# Patient Record
Sex: FEMALE | Race: WHITE | NOT HISPANIC OR LATINO | Employment: OTHER | URBAN - METROPOLITAN AREA
[De-identification: names, ages, dates, MRNs, and addresses within clinical notes are randomized per-mention and may not be internally consistent; named-entity substitution may affect disease eponyms.]

---

## 2018-08-08 ENCOUNTER — OFFICE VISIT (OUTPATIENT)
Dept: FAMILY MEDICINE CLINIC | Facility: CLINIC | Age: 63
End: 2018-08-08
Payer: COMMERCIAL

## 2018-08-08 VITALS
SYSTOLIC BLOOD PRESSURE: 126 MMHG | TEMPERATURE: 98.4 F | WEIGHT: 185.4 LBS | BODY MASS INDEX: 29.8 KG/M2 | HEART RATE: 66 BPM | DIASTOLIC BLOOD PRESSURE: 84 MMHG | OXYGEN SATURATION: 98 % | HEIGHT: 66 IN

## 2018-08-08 DIAGNOSIS — J30.9 ALLERGIC RHINITIS, UNSPECIFIED SEASONALITY, UNSPECIFIED TRIGGER: ICD-10-CM

## 2018-08-08 DIAGNOSIS — I10 ESSENTIAL HYPERTENSION: Primary | ICD-10-CM

## 2018-08-08 DIAGNOSIS — Z12.39 SCREENING FOR BREAST CANCER: ICD-10-CM

## 2018-08-08 DIAGNOSIS — D22.9 NUMEROUS MOLES: ICD-10-CM

## 2018-08-08 DIAGNOSIS — Z01.419 ENCOUNTER FOR GYNECOLOGICAL EXAMINATION: ICD-10-CM

## 2018-08-08 PROCEDURE — 99204 OFFICE O/P NEW MOD 45 MIN: CPT | Performed by: FAMILY MEDICINE

## 2018-08-08 PROCEDURE — 3079F DIAST BP 80-89 MM HG: CPT | Performed by: FAMILY MEDICINE

## 2018-08-08 PROCEDURE — 1036F TOBACCO NON-USER: CPT | Performed by: FAMILY MEDICINE

## 2018-08-08 PROCEDURE — 3074F SYST BP LT 130 MM HG: CPT | Performed by: FAMILY MEDICINE

## 2018-08-08 PROCEDURE — 3008F BODY MASS INDEX DOCD: CPT | Performed by: FAMILY MEDICINE

## 2018-08-08 RX ORDER — TRIAMTERENE AND HYDROCHLOROTHIAZIDE 37.5; 25 MG/1; MG/1
2 CAPSULE ORAL DAILY
COMMUNITY
End: 2018-09-04 | Stop reason: SDUPTHER

## 2018-08-08 RX ORDER — ATORVASTATIN CALCIUM 10 MG/1
10 TABLET, FILM COATED ORAL
COMMUNITY
End: 2018-09-04 | Stop reason: SDUPTHER

## 2018-08-08 RX ORDER — FLUTICASONE PROPIONATE 50 MCG
1 SPRAY, SUSPENSION (ML) NASAL DAILY
Qty: 16 G | Refills: 3 | Status: SHIPPED | OUTPATIENT
Start: 2018-08-08 | End: 2019-06-14 | Stop reason: SDUPTHER

## 2018-08-08 RX ORDER — AMOXICILLIN 500 MG
CAPSULE ORAL
COMMUNITY

## 2018-08-08 RX ORDER — FLUTICASONE PROPIONATE 50 MCG
1 SPRAY, SUSPENSION (ML) NASAL DAILY
COMMUNITY
End: 2018-08-08 | Stop reason: SDUPTHER

## 2018-08-08 NOTE — PROGRESS NOTES
Assessment/Plan:    No problem-specific Assessment & Plan notes found for this encounter  Diagnoses and all orders for this visit:    Essential hypertension  Stable controlled  Continue same medications  Allergic rhinitis, unspecified seasonality, unspecified trigger  -     fluticasone (FLONASE) 50 mcg/act nasal spray; 1 spray into each nostril daily    Numerous moles  -     Ambulatory referral to Dermatology; Future    Screening for breast cancer  -     Mammo screening bilateral w 3d & cad; Future    Encounter for gynecological examination  -     Ambulatory referral to Gynecology; Future    Other orders  -     atorvastatin (LIPITOR) 10 mg tablet; Take 10 mg by mouth Medrol Dose Pack scheduling ONLY  -     Omega-3 Fatty Acids (FISH OIL) 1200 MG CAPS; Take by mouth  -     Multiple Vitamins-Minerals (MULTIVITAMIN ADULT PO); Take by mouth  -     triamterene-hydrochlorothiazide (DYAZIDE) 37 5-25 mg per capsule; Take 2 capsules by mouth daily  -     Discontinue: fluticasone (FLONASE) 50 mcg/act nasal spray; 1 spray into each nostril daily        follow up in 6 months    Subjective:      Patient ID: France Owen is a 66 y o  female  Patient is here to establish care  She has a history hypertension she currently takes triamterene hydrochlorothiazide for her blood pressure  Her blood pressure is controlled on this regimen she denies any side effects from medications  She has a family history of skin cancer  She denies any personal history of skin cancer  She would like to get checked for moles and other skin lesions with dermatologist   She also has a history of seasonal allergies and would like her Flonase to be renewed  Her symptoms are nasal drainage as well as itching  She had a colonoscopy done last year polyps removed she was advised to have a follow-up colonoscopy in 5 years  She is due for Pap smear and mammography          The following portions of the patient's history were reviewed and updated as appropriate:   She  has no past medical history on file  She   Patient Active Problem List    Diagnosis Date Noted    Essential hypertension 08/08/2018    Allergic rhinitis 08/08/2018    Numerous moles 08/08/2018    Screening for breast cancer 08/08/2018    Encounter for gynecological examination 08/08/2018     She  has a past surgical history that includes Vein Surgery  Her family history includes Heart disease in her mother; Prostate cancer in her father  She  reports that she has never smoked  She has never used smokeless tobacco  She reports that she does not drink alcohol or use drugs  Current Outpatient Prescriptions   Medication Sig Dispense Refill    atorvastatin (LIPITOR) 10 mg tablet Take 10 mg by mouth Medrol Dose Pack scheduling ONLY      fluticasone (FLONASE) 50 mcg/act nasal spray 1 spray into each nostril daily 16 g 3    Multiple Vitamins-Minerals (MULTIVITAMIN ADULT PO) Take by mouth      Omega-3 Fatty Acids (FISH OIL) 1200 MG CAPS Take by mouth      triamterene-hydrochlorothiazide (DYAZIDE) 37 5-25 mg per capsule Take 2 capsules by mouth daily       No current facility-administered medications for this visit  No current outpatient prescriptions on file prior to visit  No current facility-administered medications on file prior to visit  She has no allergies on file       Review of Systems   Constitutional: Negative for activity change, appetite change, fatigue and fever  HENT: Negative for congestion and ear discharge  Respiratory: Negative for cough and shortness of breath  Cardiovascular: Negative for chest pain and palpitations  Gastrointestinal: Negative for diarrhea and nausea  Musculoskeletal: Negative for arthralgias and back pain  Skin: Negative for color change and rash  Neurological: Negative for dizziness and headaches  Psychiatric/Behavioral: Negative for agitation and behavioral problems           Objective:      /84   Pulse 66   Temp 98 4 °F (36 9 °C)   Ht 5' 5 5" (1 664 m)   Wt 84 1 kg (185 lb 6 4 oz)   SpO2 98%   BMI 30 38 kg/m²          Physical Exam   Constitutional: She is oriented to person, place, and time  She appears well-developed and well-nourished  No distress  HENT:   Head: Normocephalic and atraumatic  Nose: Nose normal    Mouth/Throat: Oropharynx is clear and moist    Eyes: Conjunctivae are normal  Pupils are equal, round, and reactive to light  Cardiovascular: Normal rate, regular rhythm and normal heart sounds  No murmur heard  Pulmonary/Chest: Effort normal and breath sounds normal  No respiratory distress  She has no wheezes  Abdominal: Soft  Bowel sounds are normal  She exhibits no distension  There is no tenderness  Neurological: She is alert and oriented to person, place, and time  Skin: Skin is warm and dry  No rash noted  She is not diaphoretic  No erythema  Psychiatric: She has a normal mood and affect

## 2018-09-04 DIAGNOSIS — E78.00 HYPERCHOLESTEREMIA: Primary | ICD-10-CM

## 2018-09-04 DIAGNOSIS — I10 ESSENTIAL HYPERTENSION: ICD-10-CM

## 2018-09-04 RX ORDER — TRIAMTERENE AND HYDROCHLOROTHIAZIDE 37.5; 25 MG/1; MG/1
2 CAPSULE ORAL DAILY
Qty: 60 CAPSULE | Refills: 5 | Status: SHIPPED | OUTPATIENT
Start: 2018-09-04 | End: 2019-02-28 | Stop reason: SDUPTHER

## 2018-09-04 RX ORDER — ATORVASTATIN CALCIUM 10 MG/1
10 TABLET, FILM COATED ORAL DAILY
Qty: 30 TABLET | Refills: 5 | Status: SHIPPED | OUTPATIENT
Start: 2018-09-04 | End: 2019-02-10 | Stop reason: SDUPTHER

## 2018-12-03 ENCOUNTER — OFFICE VISIT (OUTPATIENT)
Dept: OBGYN CLINIC | Age: 63
End: 2018-12-03
Payer: COMMERCIAL

## 2018-12-03 VITALS
SYSTOLIC BLOOD PRESSURE: 124 MMHG | WEIGHT: 192.38 LBS | HEIGHT: 66 IN | BODY MASS INDEX: 30.92 KG/M2 | DIASTOLIC BLOOD PRESSURE: 88 MMHG

## 2018-12-03 DIAGNOSIS — Z01.419 ENCOUNTER FOR GYNECOLOGICAL EXAMINATION WITHOUT ABNORMAL FINDING: Primary | ICD-10-CM

## 2018-12-03 DIAGNOSIS — Z12.31 ENCOUNTER FOR SCREENING MAMMOGRAM FOR MALIGNANT NEOPLASM OF BREAST: ICD-10-CM

## 2018-12-03 PROCEDURE — S0610 ANNUAL GYNECOLOGICAL EXAMINA: HCPCS | Performed by: OBSTETRICS & GYNECOLOGY

## 2018-12-03 PROCEDURE — 87624 HPV HI-RISK TYP POOLED RSLT: CPT | Performed by: OBSTETRICS & GYNECOLOGY

## 2018-12-03 PROCEDURE — G0145 SCR C/V CYTO,THINLAYER,RESCR: HCPCS | Performed by: OBSTETRICS & GYNECOLOGY

## 2018-12-03 NOTE — PATIENT INSTRUCTIONS
Wellness Visit for Adults   WHAT YOU NEED TO KNOW:   What is a wellness visit? A wellness visit is when you see your healthcare provider to get screened for health problems  You can also get advice on how to stay healthy  Write down your questions so you remember to ask them  Ask your healthcare provider how often you should have a wellness visit  What happens at a wellness visit? Your healthcare provider will ask about your health, and your family history of health problems  This includes high blood pressure, heart disease, and cancer  He or she will ask if you have symptoms that concern you, if you smoke, and about your mood  You may also be asked about your intake of medicines, supplements, food, and alcohol  Any of the following may be done:  · Your weight  will be checked  Your height may also be checked so your body mass index (BMI) can be calculated  Your BMI shows if you are at a healthy weight  · Your blood pressure  and heart rate will be checked  Your temperature may also be checked  · Blood and urine tests  may be done  Blood tests may be done to check your cholesterol levels  Abnormal cholesterol levels increase your risk for heart disease and stroke  You may also need a blood or urine test to check for diabetes if you are at increased risk  Urine tests may be done to look for signs of an infection or kidney disease  · A physical exam  includes checking your heartbeat and lungs with a stethoscope  Your healthcare provider may also check your skin to look for sun damage  · Screening tests  may be recommended  A screening test is done to check for diseases that may not cause symptoms  The screening tests you may need depend on your age, gender, family history, and lifestyle habits  For example, colorectal screening may be recommended if you are 48years old or older  What screening tests do I need if I am a woman? · A Pap smear  is used to screen for cervical cancer   Pap smears are usually done every 3 to 5 years depending on your age  You may need them more often if you have had abnormal Pap smear test results in the past  Ask your healthcare provider how often you should have a Pap smear  · A mammogram  is an x-ray of your breasts to screen for breast cancer  Experts recommend mammograms every 2 years starting at age 48 years  You may need a mammogram at age 52 years or younger if you have an increased risk for breast cancer  Talk to your healthcare provider about when you should start having mammograms and how often you need them  What vaccines might I need? · Get an influenza vaccine  every year  The influenza vaccine protects you from the flu  Several types of viruses cause the flu  The viruses change over time, so new vaccines are made each year  · Get a tetanus-diphtheria (Td) booster vaccine  every 10 years  This vaccine protects you against tetanus and diphtheria  Tetanus is a severe infection that may cause painful muscle spasms and lockjaw  Diphtheria is a severe bacterial infection that causes a thick covering in the back of your mouth and throat  · Get a human papillomavirus (HPV) vaccine  if you are female and aged 23 to 32 or male 23 to 24 and never received it  This vaccine protects you from HPV infection  HPV is the most common infection spread by sexual contact  HPV may also cause vaginal, penile, and anal cancers  · Get a pneumococcal vaccine  if you are aged 72 years or older  The pneumococcal vaccine is an injection given to protect you from pneumococcal disease  Pneumococcal disease is an infection caused by pneumococcal bacteria  The infection may cause pneumonia, meningitis, or an ear infection  · Get a shingles vaccine  if you are aged 61 or older, even if you have had shingles before  The shingles vaccine is an injection to protect you from the varicella-zoster virus  This is the same virus that causes chickenpox   Shingles is a painful rash that develops in people who had chickenpox or have been exposed to the virus  How can I eat healthy? My Plate is a model for planning healthy meals  It shows the types and amounts of foods that should go on your plate  Fruits and vegetables make up about half of your plate, and grains and protein make up the other half  A serving of dairy is included on the side of your plate  The amount of calories and serving sizes you need depends on your age, gender, weight, and height  Examples of healthy foods are listed below:  · Eat a variety of vegetables  such as dark green, red, and orange vegetables  You can also include canned vegetables low in sodium (salt) and frozen vegetables without added butter or sauces  · Eat a variety of fresh fruits , canned fruit in 100% juice, frozen fruit, and dried fruit  · Include whole grains  At least half of the grains you eat should be whole grains  Examples include whole-wheat bread, wheat pasta, brown rice, and whole-grain cereals such as oatmeal     · Eat a variety of protein foods such as seafood (fish and shellfish), lean meat, and poultry without skin (turkey and chicken)  Examples of lean meats include pork leg, shoulder, or tenderloin, and beef round, sirloin, tenderloin, and extra lean ground beef  Other protein foods include eggs and egg substitutes, beans, peas, soy products, nuts, and seeds  · Choose low-fat dairy products such as skim or 1% milk or low-fat yogurt, cheese, and cottage cheese  · Limit unhealthy fats  such as butter, hard margarine, and shortening  How much exercise do I need? Exercise at least 30 minutes per day on most days of the week  Some examples of exercise include walking, biking, dancing, and swimming  You can also fit in more physical activity by taking the stairs instead of the elevator or parking farther away from stores  Include muscle strengthening activities 2 days each week  Regular exercise provides many health benefits  It helps you manage your weight, and decreases your risk for type 2 diabetes, heart disease, stroke, and high blood pressure  Exercise can also help improve your mood  Ask your healthcare provider about the best exercise plan for you  What are some general health and safety guidelines I should follow? · Do not smoke  Nicotine and other chemicals in cigarettes and cigars can cause lung damage  Ask your healthcare provider for information if you currently smoke and need help to quit  E-cigarettes or smokeless tobacco still contain nicotine  Talk to your healthcare provider before you use these products  · Limit alcohol  A drink of alcohol is 12 ounces of beer, 5 ounces of wine, or 1½ ounces of liquor  · Lose weight, if needed  Being overweight increases your risk of certain health conditions  These include heart disease, high blood pressure, type 2 diabetes, and certain types of cancer  · Protect your skin  Do not sunbathe or use tanning beds  Use sunscreen with a SPF 15 or higher  Apply sunscreen at least 15 minutes before you go outside  Reapply sunscreen every 2 hours  Wear protective clothing, hats, and sunglasses when you are outside  · Drive safely  Always wear your seatbelt  Make sure everyone in your car wears a seatbelt  A seatbelt can save your life if you are in an accident  Do not use your cell phone when you are driving  This could distract you and cause an accident  Pull over if you need to make a call or send a text message  · Practice safe sex  Use latex condoms if are sexually active and have more than one partner  Your healthcare provider may recommend screening tests for sexually transmitted infections (STIs)  · Wear helmets, lifejackets, and protective gear  Always wear a helmet when you ride a bike or motorcycle, go skiing, or play sports that could cause a head injury  Wear protective equipment when you play sports   Wear a lifejacket when you are on a boat or doing water sports  CARE AGREEMENT:   You have the right to help plan your care  Learn about your health condition and how it may be treated  Discuss treatment options with your caregivers to decide what care you want to receive  You always have the right to refuse treatment  The above information is an  only  It is not intended as medical advice for individual conditions or treatments  Talk to your doctor, nurse or pharmacist before following any medical regimen to see if it is safe and effective for you  © 2017 [employer] Information is for End User's use only and may not be sold, redistributed or otherwise used for commercial purposes  All illustrations and images included in CareNotes® are the copyrighted property of [employer]  or [employer]

## 2018-12-03 NOTE — PROGRESS NOTES
Assessment/Plan:    Encounter for gynecological examination without abnormal finding  Pap/HPV collected  Mammogram ordered by PCP  Colonoscopy current    Encourage healthy diet, exercise, Calcium 1200mg per day and at least 800 iu Vitamin D daily  Diagnoses and all orders for this visit:    Encounter for gynecological examination without abnormal finding    Encounter for screening mammogram for malignant neoplasm of breast          Subjective:      Patient ID: Marleny De Paz is a 66 y o  female  Patient here for new patient yearly exam  No current complaints at this time  Age of first period: 17years old    Lmp: patient is   Last pap: 2016 per pt normal results  No records  (pap today?)  Last mammo: 2017 per pt normal, PCP gave reff for mammo already patient has slip  Colonoscopy: 2018 per pt normal (due )  Patient is not a smoker  Patient is a social drinker  MOVed up here permanently about 2 years ago   x 35+ years  Three children    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[place] [address]   The patient's pertinent negatives include no genital itching, genital lesions, genital odor, genital rash, pelvic pain, vaginal bleeding or vaginal discharge  The patient is experiencing no pain  Pertinent negatives include no chills, constipation, diarrhea, fever, frequency, nausea, painful intercourse, sore throat, urgency or vomiting  She is sexually active  She is postmenopausal        The following portions of the patient's history were reviewed and updated as appropriate:   She  has a past medical history of Fibroids; High cholesterol; and Hypertension    She   Patient Active Problem List    Diagnosis Date Noted    Encounter for gynecological examination without abnormal finding 2018    Essential hypertension 2018    Allergic rhinitis 2018    Numerous moles 2018    Screening for breast cancer 08/08/2018     She  has a past surgical history that includes Vein Surgery and Colonoscopy (2018)  Her family history includes Heart disease in her mother; Prostate cancer in her father  She  reports that she has never smoked  She has never used smokeless tobacco  She reports that she drinks alcohol  She reports that she does not use drugs  Current Outpatient Prescriptions   Medication Sig Dispense Refill    atorvastatin (LIPITOR) 10 mg tablet Take 1 tablet (10 mg total) by mouth daily 30 tablet 5    fluticasone (FLONASE) 50 mcg/act nasal spray 1 spray into each nostril daily 16 g 3    Multiple Vitamins-Minerals (MULTIVITAMIN ADULT PO) Take by mouth      Omega-3 Fatty Acids (FISH OIL) 1200 MG CAPS Take by mouth      triamterene-hydrochlorothiazide (DYAZIDE) 37 5-25 mg per capsule Take 2 capsules by mouth daily 60 capsule 5     No current facility-administered medications for this visit  Current Outpatient Prescriptions on File Prior to Visit   Medication Sig    atorvastatin (LIPITOR) 10 mg tablet Take 1 tablet (10 mg total) by mouth daily    fluticasone (FLONASE) 50 mcg/act nasal spray 1 spray into each nostril daily    Multiple Vitamins-Minerals (MULTIVITAMIN ADULT PO) Take by mouth    Omega-3 Fatty Acids (FISH OIL) 1200 MG CAPS Take by mouth    triamterene-hydrochlorothiazide (DYAZIDE) 37 5-25 mg per capsule Take 2 capsules by mouth daily     No current facility-administered medications on file prior to visit  She has No Known Allergies       Review of Systems   Constitutional: Negative for activity change, appetite change, chills, fatigue and fever  HENT: Negative for rhinorrhea, sneezing and sore throat  Eyes: Negative for visual disturbance  Respiratory: Negative for cough, shortness of breath and wheezing  Cardiovascular: Negative for chest pain, palpitations and leg swelling  Gastrointestinal: Negative for abdominal distention, constipation, diarrhea, nausea and vomiting  Genitourinary: Negative for difficulty urinating, frequency, pelvic pain, urgency and vaginal discharge  Neurological: Negative for syncope and light-headedness  Objective:      /88 (BP Location: Right arm, Patient Position: Sitting, Cuff Size: Standard)   Ht 5' 5 5" (1 664 m)   Wt 87 3 kg (192 lb 6 oz)   LMP  (LMP Unknown)   Breastfeeding? No   BMI 31 53 kg/m²          Physical Exam   Genitourinary: No breast swelling, tenderness, discharge or bleeding  There is no rash, tenderness, lesion or injury on the right labia  There is no rash, tenderness, lesion or injury on the left labia  Uterus is not deviated, not enlarged, not fixed and not tender  Cervix exhibits no motion tenderness, no discharge and no friability  Right adnexum displays no mass, no tenderness and no fullness  Left adnexum displays no mass, no tenderness and no fullness  No bleeding in the vagina  No vaginal discharge found     Genitourinary Comments: Thin, atrophic vaginal mucosa

## 2018-12-03 NOTE — ASSESSMENT & PLAN NOTE
Pap/HPV collected  Mammogram ordered by PCP  Colonoscopy current    Encourage healthy diet, exercise, Calcium 1200mg per day and at least 800 iu Vitamin D daily

## 2018-12-05 LAB
HPV HR 12 DNA CVX QL NAA+PROBE: NEGATIVE
HPV16 DNA CVX QL NAA+PROBE: NEGATIVE
HPV18 DNA CVX QL NAA+PROBE: NEGATIVE

## 2018-12-06 LAB
LAB AP GYN PRIMARY INTERPRETATION: NORMAL
Lab: NORMAL

## 2018-12-10 ENCOUNTER — TELEPHONE (OUTPATIENT)
Dept: OBGYN CLINIC | Age: 63
End: 2018-12-10

## 2018-12-10 NOTE — TELEPHONE ENCOUNTER
----- Message from Rachid Cid MD sent at 12/7/2018  2:54 PM EST -----  Please call sudhir pap and hpv negative

## 2019-02-06 ENCOUNTER — APPOINTMENT (OUTPATIENT)
Dept: LAB | Facility: CLINIC | Age: 64
End: 2019-02-06
Payer: COMMERCIAL

## 2019-02-06 ENCOUNTER — OFFICE VISIT (OUTPATIENT)
Dept: FAMILY MEDICINE CLINIC | Facility: CLINIC | Age: 64
End: 2019-02-06
Payer: COMMERCIAL

## 2019-02-06 VITALS
TEMPERATURE: 97.9 F | DIASTOLIC BLOOD PRESSURE: 76 MMHG | RESPIRATION RATE: 16 BRPM | WEIGHT: 195.4 LBS | OXYGEN SATURATION: 99 % | SYSTOLIC BLOOD PRESSURE: 122 MMHG | HEIGHT: 66 IN | HEART RATE: 78 BPM | BODY MASS INDEX: 31.4 KG/M2

## 2019-02-06 DIAGNOSIS — Z13.1 SCREENING FOR DIABETES MELLITUS: ICD-10-CM

## 2019-02-06 DIAGNOSIS — Z11.59 NEED FOR HEPATITIS C SCREENING TEST: ICD-10-CM

## 2019-02-06 DIAGNOSIS — Z13.220 NEED FOR LIPID SCREENING: ICD-10-CM

## 2019-02-06 DIAGNOSIS — I10 ESSENTIAL HYPERTENSION: Primary | ICD-10-CM

## 2019-02-06 DIAGNOSIS — Z23 NEED FOR INFLUENZA VACCINATION: ICD-10-CM

## 2019-02-06 LAB
ALBUMIN SERPL BCP-MCNC: 4 G/DL (ref 3.5–5)
ALP SERPL-CCNC: 80 U/L (ref 46–116)
ALT SERPL W P-5'-P-CCNC: 29 U/L (ref 12–78)
ANION GAP SERPL CALCULATED.3IONS-SCNC: 9 MMOL/L (ref 4–13)
AST SERPL W P-5'-P-CCNC: 20 U/L (ref 5–45)
BILIRUB SERPL-MCNC: 0.73 MG/DL (ref 0.2–1)
BUN SERPL-MCNC: 13 MG/DL (ref 5–25)
CALCIUM SERPL-MCNC: 9.3 MG/DL (ref 8.3–10.1)
CHLORIDE SERPL-SCNC: 93 MMOL/L (ref 100–108)
CHOLEST SERPL-MCNC: 242 MG/DL (ref 50–200)
CO2 SERPL-SCNC: 29 MMOL/L (ref 21–32)
CREAT SERPL-MCNC: 0.64 MG/DL (ref 0.6–1.3)
EST. AVERAGE GLUCOSE BLD GHB EST-MCNC: 123 MG/DL
GFR SERPL CREATININE-BSD FRML MDRD: 97 ML/MIN/1.73SQ M
GLUCOSE P FAST SERPL-MCNC: 96 MG/DL (ref 65–99)
HBA1C MFR BLD: 5.9 % (ref 4.2–6.3)
HDLC SERPL-MCNC: 88 MG/DL (ref 40–60)
LDLC SERPL CALC-MCNC: 128 MG/DL (ref 0–100)
NONHDLC SERPL-MCNC: 154 MG/DL
POTASSIUM SERPL-SCNC: 3.5 MMOL/L (ref 3.5–5.3)
PROT SERPL-MCNC: 7.4 G/DL (ref 6.4–8.2)
SODIUM SERPL-SCNC: 131 MMOL/L (ref 136–145)
TRIGL SERPL-MCNC: 132 MG/DL

## 2019-02-06 PROCEDURE — 90686 IIV4 VACC NO PRSV 0.5 ML IM: CPT | Performed by: FAMILY MEDICINE

## 2019-02-06 PROCEDURE — 36415 COLL VENOUS BLD VENIPUNCTURE: CPT

## 2019-02-06 PROCEDURE — 3078F DIAST BP <80 MM HG: CPT | Performed by: FAMILY MEDICINE

## 2019-02-06 PROCEDURE — 80053 COMPREHEN METABOLIC PANEL: CPT

## 2019-02-06 PROCEDURE — 86803 HEPATITIS C AB TEST: CPT

## 2019-02-06 PROCEDURE — 3074F SYST BP LT 130 MM HG: CPT | Performed by: FAMILY MEDICINE

## 2019-02-06 PROCEDURE — 99213 OFFICE O/P EST LOW 20 MIN: CPT | Performed by: FAMILY MEDICINE

## 2019-02-06 PROCEDURE — 83036 HEMOGLOBIN GLYCOSYLATED A1C: CPT

## 2019-02-06 PROCEDURE — 80061 LIPID PANEL: CPT

## 2019-02-06 PROCEDURE — 3008F BODY MASS INDEX DOCD: CPT | Performed by: FAMILY MEDICINE

## 2019-02-06 PROCEDURE — 90471 IMMUNIZATION ADMIN: CPT | Performed by: FAMILY MEDICINE

## 2019-02-06 NOTE — PROGRESS NOTES
Assessment/Plan:    No problem-specific Assessment & Plan notes found for this encounter  Diagnoses and all orders for this visit:    Essential hypertension  Stable controlled continue same medication and dose  BMI 32 0-32 9,adult  She was counseled in regards to lifestyle modifications  Need for influenza vaccination  -     SYRINGE/SINGLE-DOSE VIAL: influenza vaccine, 6790-1059, quadrivalent, 0 5 mL, preservative-free (AFLURIA, FLUARIX, FLULAVAL, FLUZONE)    Screening for diabetes mellitus  -     Comprehensive metabolic panel; Future  -     Hemoglobin A1C; Future    Need for lipid screening  -     Lipid panel; Future    Need for hepatitis C screening test  -     Hepatitis C antibody; Future      Follow up in 6 months or as needed    Subjective:      Patient ID: Preston Fountain is a 66 y o  female  Hypertension  Patient is here for follow-up of elevated blood pressure  She is not exercising and is not adherent to a low-salt diet  Blood pressure is well controlled at home  Cardiac symptoms: none  Patient denies chest pain, chest pressure/discomfort and claudication  Cardiovascular risk factors: hypertension, obesity (BMI >= 30 kg/m2) and sedentary lifestyle  Use of agents associated with hypertension: none  History of target organ damage: none  She is complainign that she has gained some weight recently and she would like to lose some weight  The following portions of the patient's history were reviewed and updated as appropriate:   She  has a past medical history of Fibroids; High cholesterol; and Hypertension    She   Patient Active Problem List    Diagnosis Date Noted    BMI 32 0-32 9,adult 02/06/2019    Need for influenza vaccination 02/06/2019    Screening for diabetes mellitus 02/06/2019    Need for lipid screening 02/06/2019    Need for hepatitis C screening test 02/06/2019    Encounter for gynecological examination without abnormal finding 12/03/2018    Essential hypertension 08/08/2018    Allergic rhinitis 08/08/2018    Numerous moles 08/08/2018    Screening for breast cancer 08/08/2018     She  has a past surgical history that includes Vein Surgery and Colonoscopy (2018)  Her family history includes Heart disease in her mother; Prostate cancer in her father  She  reports that she has never smoked  She has never used smokeless tobacco  She reports that she drinks alcohol  She reports that she does not use drugs  Current Outpatient Prescriptions   Medication Sig Dispense Refill    atorvastatin (LIPITOR) 10 mg tablet Take 1 tablet (10 mg total) by mouth daily 30 tablet 5    fluticasone (FLONASE) 50 mcg/act nasal spray 1 spray into each nostril daily 16 g 3    Multiple Vitamins-Minerals (MULTIVITAMIN ADULT PO) Take by mouth      Omega-3 Fatty Acids (FISH OIL) 1200 MG CAPS Take by mouth      triamterene-hydrochlorothiazide (DYAZIDE) 37 5-25 mg per capsule Take 2 capsules by mouth daily 60 capsule 5     No current facility-administered medications for this visit  Current Outpatient Prescriptions on File Prior to Visit   Medication Sig    atorvastatin (LIPITOR) 10 mg tablet Take 1 tablet (10 mg total) by mouth daily    fluticasone (FLONASE) 50 mcg/act nasal spray 1 spray into each nostril daily    Multiple Vitamins-Minerals (MULTIVITAMIN ADULT PO) Take by mouth    Omega-3 Fatty Acids (FISH OIL) 1200 MG CAPS Take by mouth    triamterene-hydrochlorothiazide (DYAZIDE) 37 5-25 mg per capsule Take 2 capsules by mouth daily     No current facility-administered medications on file prior to visit  She is allergic to rosuvastatin and spironolactone       Review of Systems   Constitutional: Positive for unexpected weight change  Negative for activity change, appetite change, fatigue and fever  HENT: Negative for congestion and ear discharge  Respiratory: Negative for cough and shortness of breath  Cardiovascular: Negative for chest pain and palpitations  Gastrointestinal: Negative for diarrhea and nausea  Musculoskeletal: Negative for arthralgias and back pain  Skin: Negative for color change and rash  Neurological: Negative for dizziness and headaches  Psychiatric/Behavioral: Negative for agitation and behavioral problems  Objective:      /76 (BP Location: Left arm, Patient Position: Sitting, Cuff Size: Adult)   Pulse 78   Temp 97 9 °F (36 6 °C) (Tympanic)   Resp 16   Ht 5' 5 5" (1 664 m)   Wt 88 6 kg (195 lb 6 4 oz)   LMP  (LMP Unknown)   SpO2 99%   BMI 32 02 kg/m²          Physical Exam   Constitutional: She is oriented to person, place, and time  She appears well-developed and well-nourished  No distress  HENT:   Head: Normocephalic and atraumatic  Nose: Nose normal    Mouth/Throat: Oropharynx is clear and moist    Eyes: Pupils are equal, round, and reactive to light  Conjunctivae are normal    Cardiovascular: Normal rate, regular rhythm and normal heart sounds  No murmur heard  Pulmonary/Chest: Effort normal and breath sounds normal  No respiratory distress  She has no wheezes  Abdominal: Soft  Bowel sounds are normal  She exhibits no distension  There is no tenderness  Neurological: She is alert and oriented to person, place, and time  Skin: Skin is warm and dry  No rash noted  She is not diaphoretic  No erythema  Psychiatric: She has a normal mood and affect

## 2019-02-07 LAB — HCV AB SER QL: NORMAL

## 2019-02-10 DIAGNOSIS — E78.00 HYPERCHOLESTEREMIA: ICD-10-CM

## 2019-02-10 RX ORDER — ATORVASTATIN CALCIUM 20 MG/1
20 TABLET, FILM COATED ORAL DAILY
Qty: 30 TABLET | Refills: 2 | Status: SHIPPED | OUTPATIENT
Start: 2019-02-10 | End: 2019-05-07 | Stop reason: SDUPTHER

## 2019-02-28 DIAGNOSIS — I10 ESSENTIAL HYPERTENSION: ICD-10-CM

## 2019-02-28 RX ORDER — TRIAMTERENE AND HYDROCHLOROTHIAZIDE 37.5; 25 MG/1; MG/1
CAPSULE ORAL
Qty: 60 CAPSULE | Refills: 5 | Status: SHIPPED | OUTPATIENT
Start: 2019-02-28 | End: 2019-05-28 | Stop reason: ALTCHOICE

## 2019-04-11 ENCOUNTER — TELEPHONE (OUTPATIENT)
Dept: FAMILY MEDICINE CLINIC | Facility: CLINIC | Age: 64
End: 2019-04-11

## 2019-04-12 ENCOUNTER — TELEPHONE (OUTPATIENT)
Dept: FAMILY MEDICINE CLINIC | Facility: CLINIC | Age: 64
End: 2019-04-12

## 2019-04-16 RX ORDER — PHENTERMINE HYDROCHLORIDE 30 MG/1
30 CAPSULE ORAL EVERY MORNING
Qty: 30 CAPSULE | Refills: 1 | Status: SHIPPED | OUTPATIENT
Start: 2019-04-16 | End: 2019-06-14 | Stop reason: ALTCHOICE

## 2019-05-03 ENCOUNTER — OFFICE VISIT (OUTPATIENT)
Dept: FAMILY MEDICINE CLINIC | Facility: CLINIC | Age: 64
End: 2019-05-03
Payer: COMMERCIAL

## 2019-05-03 VITALS
WEIGHT: 196.8 LBS | TEMPERATURE: 99.3 F | OXYGEN SATURATION: 96 % | BODY MASS INDEX: 31.63 KG/M2 | HEIGHT: 66 IN | HEART RATE: 74 BPM | DIASTOLIC BLOOD PRESSURE: 88 MMHG | SYSTOLIC BLOOD PRESSURE: 134 MMHG

## 2019-05-03 DIAGNOSIS — R05.9 COUGH: Primary | ICD-10-CM

## 2019-05-03 PROCEDURE — 99214 OFFICE O/P EST MOD 30 MIN: CPT | Performed by: FAMILY MEDICINE

## 2019-05-03 RX ORDER — AZITHROMYCIN 250 MG/1
250 TABLET, FILM COATED ORAL EVERY 24 HOURS
Qty: 5 TABLET | Refills: 0 | Status: SHIPPED | OUTPATIENT
Start: 2019-05-03 | End: 2019-05-08

## 2019-05-03 RX ORDER — METHYLPREDNISOLONE 4 MG/1
TABLET ORAL
Qty: 21 EACH | Refills: 0 | Status: SHIPPED | OUTPATIENT
Start: 2019-05-03 | End: 2019-06-14 | Stop reason: ALTCHOICE

## 2019-05-03 RX ORDER — DEXTROMETHORPHAN HYDROBROMIDE AND PROMETHAZINE HYDROCHLORIDE 15; 6.25 MG/5ML; MG/5ML
5 SYRUP ORAL 4 TIMES DAILY PRN
Qty: 118 ML | Refills: 1 | Status: SHIPPED | OUTPATIENT
Start: 2019-05-03 | End: 2019-06-14 | Stop reason: ALTCHOICE

## 2019-05-07 DIAGNOSIS — E78.00 HYPERCHOLESTEREMIA: ICD-10-CM

## 2019-05-07 RX ORDER — ATORVASTATIN CALCIUM 20 MG/1
TABLET, FILM COATED ORAL
Qty: 30 TABLET | Refills: 2 | Status: SHIPPED | OUTPATIENT
Start: 2019-05-07 | End: 2019-06-01 | Stop reason: SDUPTHER

## 2019-05-28 ENCOUNTER — TELEPHONE (OUTPATIENT)
Dept: FAMILY MEDICINE CLINIC | Facility: CLINIC | Age: 64
End: 2019-05-28

## 2019-05-28 DIAGNOSIS — I10 ESSENTIAL HYPERTENSION: Primary | ICD-10-CM

## 2019-05-28 RX ORDER — CHLORTHALIDONE 25 MG/1
25 TABLET ORAL DAILY
Qty: 30 TABLET | Refills: 1 | Status: SHIPPED | OUTPATIENT
Start: 2019-05-28 | End: 2019-06-14 | Stop reason: ALTCHOICE

## 2019-05-31 ENCOUNTER — TELEPHONE (OUTPATIENT)
Dept: FAMILY MEDICINE CLINIC | Facility: CLINIC | Age: 64
End: 2019-05-31

## 2019-05-31 ENCOUNTER — APPOINTMENT (OUTPATIENT)
Dept: RADIOLOGY | Facility: CLINIC | Age: 64
End: 2019-05-31
Payer: COMMERCIAL

## 2019-05-31 DIAGNOSIS — R05.9 COUGH: ICD-10-CM

## 2019-05-31 DIAGNOSIS — R05.9 COUGH: Primary | ICD-10-CM

## 2019-05-31 PROCEDURE — 71046 X-RAY EXAM CHEST 2 VIEWS: CPT

## 2019-05-31 RX ORDER — PROMETHAZINE HYDROCHLORIDE AND CODEINE PHOSPHATE 6.25; 1 MG/5ML; MG/5ML
5 SYRUP ORAL EVERY 4 HOURS PRN
Qty: 120 ML | Refills: 0 | Status: SHIPPED | OUTPATIENT
Start: 2019-05-31 | End: 2019-06-14 | Stop reason: ALTCHOICE

## 2019-06-01 DIAGNOSIS — E78.00 HYPERCHOLESTEREMIA: ICD-10-CM

## 2019-06-03 RX ORDER — ATORVASTATIN CALCIUM 20 MG/1
TABLET, FILM COATED ORAL
Qty: 30 TABLET | Refills: 0 | Status: SHIPPED | OUTPATIENT
Start: 2019-06-03 | End: 2019-06-14 | Stop reason: SDUPTHER

## 2019-06-14 ENCOUNTER — OFFICE VISIT (OUTPATIENT)
Dept: FAMILY MEDICINE CLINIC | Facility: CLINIC | Age: 64
End: 2019-06-14
Payer: COMMERCIAL

## 2019-06-14 VITALS
TEMPERATURE: 98.9 F | SYSTOLIC BLOOD PRESSURE: 134 MMHG | HEART RATE: 69 BPM | BODY MASS INDEX: 32.11 KG/M2 | OXYGEN SATURATION: 98 % | DIASTOLIC BLOOD PRESSURE: 84 MMHG | HEIGHT: 66 IN | WEIGHT: 199.8 LBS

## 2019-06-14 DIAGNOSIS — I10 ESSENTIAL HYPERTENSION: Primary | ICD-10-CM

## 2019-06-14 DIAGNOSIS — J30.9 ALLERGIC RHINITIS, UNSPECIFIED SEASONALITY, UNSPECIFIED TRIGGER: ICD-10-CM

## 2019-06-14 DIAGNOSIS — Z12.39 SCREENING FOR BREAST CANCER: ICD-10-CM

## 2019-06-14 DIAGNOSIS — E78.00 HYPERCHOLESTEREMIA: ICD-10-CM

## 2019-06-14 PROCEDURE — 3008F BODY MASS INDEX DOCD: CPT | Performed by: FAMILY MEDICINE

## 2019-06-14 PROCEDURE — 1036F TOBACCO NON-USER: CPT | Performed by: FAMILY MEDICINE

## 2019-06-14 PROCEDURE — 3075F SYST BP GE 130 - 139MM HG: CPT | Performed by: FAMILY MEDICINE

## 2019-06-14 PROCEDURE — 3079F DIAST BP 80-89 MM HG: CPT | Performed by: FAMILY MEDICINE

## 2019-06-14 PROCEDURE — 99214 OFFICE O/P EST MOD 30 MIN: CPT | Performed by: FAMILY MEDICINE

## 2019-06-14 RX ORDER — FLUTICASONE PROPIONATE 50 MCG
1 SPRAY, SUSPENSION (ML) NASAL DAILY
Qty: 16 G | Refills: 3 | Status: SHIPPED | OUTPATIENT
Start: 2019-06-14 | End: 2019-10-07 | Stop reason: SDUPTHER

## 2019-06-14 RX ORDER — ATORVASTATIN CALCIUM 10 MG/1
10 TABLET, FILM COATED ORAL DAILY
Qty: 30 TABLET | Refills: 2
Start: 2019-06-14 | End: 2019-08-12 | Stop reason: SDUPTHER

## 2019-06-14 RX ORDER — HYDROCHLOROTHIAZIDE 12.5 MG/1
12.5 TABLET ORAL DAILY
Qty: 30 TABLET | Refills: 1 | Status: SHIPPED | OUTPATIENT
Start: 2019-06-14 | End: 2019-07-06 | Stop reason: SDUPTHER

## 2019-06-14 RX ORDER — PHENTERMINE HYDROCHLORIDE 15 MG/1
15 CAPSULE ORAL EVERY MORNING
Qty: 30 CAPSULE | Refills: 1 | Status: SHIPPED | OUTPATIENT
Start: 2019-06-14 | End: 2019-08-06 | Stop reason: ALTCHOICE

## 2019-06-19 DIAGNOSIS — I10 ESSENTIAL HYPERTENSION: ICD-10-CM

## 2019-06-19 RX ORDER — CHLORTHALIDONE 25 MG/1
25 TABLET ORAL DAILY
Qty: 30 TABLET | Refills: 1 | Status: SHIPPED | OUTPATIENT
Start: 2019-06-19 | End: 2019-07-03 | Stop reason: SDUPTHER

## 2019-07-03 DIAGNOSIS — E78.00 HYPERCHOLESTEREMIA: ICD-10-CM

## 2019-07-03 DIAGNOSIS — I10 ESSENTIAL HYPERTENSION: ICD-10-CM

## 2019-07-03 RX ORDER — CHLORTHALIDONE 25 MG/1
25 TABLET ORAL DAILY
Qty: 90 TABLET | Refills: 3 | Status: SHIPPED | OUTPATIENT
Start: 2019-07-03 | End: 2019-08-06 | Stop reason: ALTCHOICE

## 2019-07-03 RX ORDER — ATORVASTATIN CALCIUM 20 MG/1
TABLET, FILM COATED ORAL
Qty: 30 TABLET | Refills: 0 | Status: SHIPPED | OUTPATIENT
Start: 2019-07-03 | End: 2019-07-30 | Stop reason: SDUPTHER

## 2019-07-06 DIAGNOSIS — I10 ESSENTIAL HYPERTENSION: ICD-10-CM

## 2019-07-08 RX ORDER — HYDROCHLOROTHIAZIDE 12.5 MG/1
12.5 TABLET ORAL DAILY
Qty: 30 TABLET | Refills: 1 | Status: SHIPPED | OUTPATIENT
Start: 2019-07-08 | End: 2019-07-25 | Stop reason: SDUPTHER

## 2019-07-25 DIAGNOSIS — I10 ESSENTIAL HYPERTENSION: ICD-10-CM

## 2019-07-25 RX ORDER — HYDROCHLOROTHIAZIDE 12.5 MG/1
12.5 TABLET ORAL DAILY
Qty: 30 TABLET | Refills: 1 | Status: SHIPPED | OUTPATIENT
Start: 2019-07-25 | End: 2020-02-04 | Stop reason: SDUPTHER

## 2019-07-30 DIAGNOSIS — E78.00 HYPERCHOLESTEREMIA: ICD-10-CM

## 2019-07-30 RX ORDER — ATORVASTATIN CALCIUM 20 MG/1
TABLET, FILM COATED ORAL
Qty: 30 TABLET | Refills: 0 | Status: SHIPPED | OUTPATIENT
Start: 2019-07-30 | End: 2019-08-06 | Stop reason: ALTCHOICE

## 2019-08-06 ENCOUNTER — OFFICE VISIT (OUTPATIENT)
Dept: FAMILY MEDICINE CLINIC | Facility: CLINIC | Age: 64
End: 2019-08-06
Payer: COMMERCIAL

## 2019-08-06 VITALS
SYSTOLIC BLOOD PRESSURE: 124 MMHG | DIASTOLIC BLOOD PRESSURE: 82 MMHG | WEIGHT: 198.4 LBS | BODY MASS INDEX: 31.88 KG/M2 | HEIGHT: 66 IN | HEART RATE: 62 BPM | TEMPERATURE: 98.6 F | OXYGEN SATURATION: 96 %

## 2019-08-06 DIAGNOSIS — E78.00 HYPERCHOLESTEREMIA: ICD-10-CM

## 2019-08-06 DIAGNOSIS — I10 ESSENTIAL HYPERTENSION: Primary | ICD-10-CM

## 2019-08-06 DIAGNOSIS — R73.01 IMPAIRED FASTING GLUCOSE: ICD-10-CM

## 2019-08-06 PROCEDURE — 1036F TOBACCO NON-USER: CPT | Performed by: FAMILY MEDICINE

## 2019-08-06 PROCEDURE — 3074F SYST BP LT 130 MM HG: CPT | Performed by: FAMILY MEDICINE

## 2019-08-06 PROCEDURE — 99213 OFFICE O/P EST LOW 20 MIN: CPT | Performed by: FAMILY MEDICINE

## 2019-08-06 PROCEDURE — 3008F BODY MASS INDEX DOCD: CPT | Performed by: FAMILY MEDICINE

## 2019-08-06 NOTE — PROGRESS NOTES
Assessment/Plan:    No problem-specific Assessment & Plan notes found for this encounter  Diagnoses and all orders for this visit:    Essential hypertension  Stable controlled continue same medication and dose  Hypercholesteremia  -     Lipid panel; Future    Impaired fasting glucose  Recommend for her to follow a low carb diet she is interested in doing weight watchers again   -     Basic metabolic panel; Future  -     Hemoglobin A1C; Future      Follow up in 6 months    Subjective:      Patient ID: Lui Torres is a 60 y o  female  Patient is here to follow-up for hypertension she denies any symptoms at this time such as headaches or changes in vision  She said that her blood pressure has been controlled at home she does take hydrochlorothiazide medication daily denies any side effects from the medication  She is also here to follow-up for blood work she had blood work done back in February 2019 which showed borderline cholesterol and also borderline glucose  She denies any symptoms at this time such as polyuria polyphagia polydipsia  She has been trying to lose weight however has been unsuccessful she has success in the past with weight watchers and would like to try that at this time  The following portions of the patient's history were reviewed and updated as appropriate:   She  has a past medical history of Fibroids, High cholesterol, and Hypertension    She   Patient Active Problem List    Diagnosis Date Noted    Impaired fasting glucose 08/06/2019    Hypercholesteremia 06/14/2019    Cough 05/03/2019    BMI 32 0-32 9,adult 02/06/2019    Need for influenza vaccination 02/06/2019    Screening for diabetes mellitus 02/06/2019    Need for lipid screening 02/06/2019    Need for hepatitis C screening test 02/06/2019    Encounter for gynecological examination without abnormal finding 12/03/2018    Essential hypertension 08/08/2018    Allergic rhinitis 08/08/2018    Numerous moles 08/08/2018    Screening for breast cancer 08/08/2018     She  has a past surgical history that includes Vein Surgery and Colonoscopy (2018)  Her family history includes Heart disease in her mother; Prostate cancer in her father  She  reports that she has never smoked  She has never used smokeless tobacco  She reports that she drinks alcohol  She reports that she does not use drugs  Current Outpatient Medications   Medication Sig Dispense Refill    atorvastatin (LIPITOR) 10 mg tablet Take 1 tablet (10 mg total) by mouth daily for 30 days 30 tablet 2    fluticasone (FLONASE) 50 mcg/act nasal spray 1 spray into each nostril daily 16 g 3    hydrochlorothiazide (HYDRODIURIL) 12 5 mg tablet Take 1 tablet (12 5 mg total) by mouth daily for 30 days 30 tablet 1    Multiple Vitamins-Minerals (MULTIVITAMIN ADULT PO) Take by mouth      Omega-3 Fatty Acids (FISH OIL) 1200 MG CAPS Take by mouth       No current facility-administered medications for this visit  Current Outpatient Medications on File Prior to Visit   Medication Sig    atorvastatin (LIPITOR) 10 mg tablet Take 1 tablet (10 mg total) by mouth daily for 30 days    fluticasone (FLONASE) 50 mcg/act nasal spray 1 spray into each nostril daily    hydrochlorothiazide (HYDRODIURIL) 12 5 mg tablet Take 1 tablet (12 5 mg total) by mouth daily for 30 days    Multiple Vitamins-Minerals (MULTIVITAMIN ADULT PO) Take by mouth    Omega-3 Fatty Acids (FISH OIL) 1200 MG CAPS Take by mouth    [DISCONTINUED] atorvastatin (LIPITOR) 20 mg tablet TAKE 1 TABLET BY MOUTH EVERY DAY (Patient not taking: Reported on 8/6/2019)    [DISCONTINUED] chlorthalidone 25 mg tablet Take 1 tablet (25 mg total) by mouth daily for 30 days    [DISCONTINUED] phentermine 15 MG capsule Take 1 capsule (15 mg total) by mouth every morning for 30 days     No current facility-administered medications on file prior to visit  She is allergic to rosuvastatin and spironolactone       Review of Systems   Constitutional: Negative for activity change, appetite change, fatigue and fever  HENT: Negative for congestion and ear discharge  Respiratory: Negative for cough and shortness of breath  Cardiovascular: Negative for chest pain and palpitations  Gastrointestinal: Negative for diarrhea and nausea  Musculoskeletal: Negative for arthralgias and back pain  Skin: Negative for color change and rash  Neurological: Negative for dizziness and headaches  Psychiatric/Behavioral: Negative for agitation and behavioral problems  Objective:      /82   Pulse 62   Temp 98 6 °F (37 °C) (Tympanic)   Ht 5' 5 5" (1 664 m)   Wt 90 kg (198 lb 6 4 oz)   LMP  (LMP Unknown)   SpO2 96%   BMI 32 51 kg/m²          Physical Exam   Constitutional: She is oriented to person, place, and time  She appears well-developed and well-nourished  No distress  HENT:   Head: Normocephalic and atraumatic  Nose: Nose normal    Mouth/Throat: Oropharynx is clear and moist    Eyes: Pupils are equal, round, and reactive to light  Conjunctivae are normal    Cardiovascular: Normal rate, regular rhythm and normal heart sounds  No murmur heard  Pulmonary/Chest: Effort normal and breath sounds normal  No respiratory distress  She has no wheezes  Abdominal: Soft  Bowel sounds are normal  She exhibits no distension  There is no tenderness  Neurological: She is alert and oriented to person, place, and time  Skin: Skin is warm and dry  No rash noted  She is not diaphoretic  No erythema  Psychiatric: She has a normal mood and affect

## 2019-08-12 DIAGNOSIS — E78.00 HYPERCHOLESTEREMIA: ICD-10-CM

## 2019-08-12 RX ORDER — ATORVASTATIN CALCIUM 10 MG/1
10 TABLET, FILM COATED ORAL DAILY
Qty: 90 TABLET | Refills: 3 | Status: SHIPPED | OUTPATIENT
Start: 2019-08-12 | End: 2019-08-13 | Stop reason: SDUPTHER

## 2019-08-13 DIAGNOSIS — E78.00 HYPERCHOLESTEREMIA: ICD-10-CM

## 2019-08-13 RX ORDER — ATORVASTATIN CALCIUM 10 MG/1
10 TABLET, FILM COATED ORAL DAILY
Qty: 90 TABLET | Refills: 3 | Status: SHIPPED | OUTPATIENT
Start: 2019-08-13 | End: 2020-08-24

## 2019-08-17 DIAGNOSIS — I10 ESSENTIAL HYPERTENSION: ICD-10-CM

## 2019-08-17 RX ORDER — HYDROCHLOROTHIAZIDE 12.5 MG/1
12.5 TABLET ORAL DAILY
Qty: 30 TABLET | Refills: 1 | Status: SHIPPED | OUTPATIENT
Start: 2019-08-17 | End: 2020-02-04 | Stop reason: SDUPTHER

## 2019-09-16 DIAGNOSIS — I10 ESSENTIAL HYPERTENSION: ICD-10-CM

## 2019-09-16 RX ORDER — HYDROCHLOROTHIAZIDE 12.5 MG/1
12.5 TABLET ORAL DAILY
Qty: 30 TABLET | Refills: 1 | Status: SHIPPED | OUTPATIENT
Start: 2019-09-16 | End: 2019-10-13 | Stop reason: SDUPTHER

## 2019-09-27 DIAGNOSIS — Z12.39 SCREENING FOR BREAST CANCER: ICD-10-CM

## 2019-09-27 DIAGNOSIS — R92.8 ABNORMAL FINDING ON MAMMOGRAPHY: Primary | ICD-10-CM

## 2019-10-07 DIAGNOSIS — J30.9 ALLERGIC RHINITIS, UNSPECIFIED SEASONALITY, UNSPECIFIED TRIGGER: ICD-10-CM

## 2019-10-07 RX ORDER — FLUTICASONE PROPIONATE 50 MCG
SPRAY, SUSPENSION (ML) NASAL
Qty: 16 ML | Refills: 3 | Status: SHIPPED | OUTPATIENT
Start: 2019-10-07 | End: 2020-06-16

## 2019-10-13 DIAGNOSIS — I10 ESSENTIAL HYPERTENSION: ICD-10-CM

## 2019-10-14 RX ORDER — HYDROCHLOROTHIAZIDE 12.5 MG/1
12.5 TABLET ORAL DAILY
Qty: 30 TABLET | Refills: 1 | Status: SHIPPED | OUTPATIENT
Start: 2019-10-14 | End: 2019-11-05 | Stop reason: SDUPTHER

## 2019-11-05 DIAGNOSIS — I10 ESSENTIAL HYPERTENSION: ICD-10-CM

## 2019-11-05 RX ORDER — HYDROCHLOROTHIAZIDE 12.5 MG/1
12.5 TABLET ORAL DAILY
Qty: 30 TABLET | Refills: 1 | Status: SHIPPED | OUTPATIENT
Start: 2019-11-05 | End: 2020-03-19

## 2020-02-04 ENCOUNTER — OFFICE VISIT (OUTPATIENT)
Dept: FAMILY MEDICINE CLINIC | Facility: CLINIC | Age: 65
End: 2020-02-04
Payer: COMMERCIAL

## 2020-02-04 VITALS
SYSTOLIC BLOOD PRESSURE: 128 MMHG | BODY MASS INDEX: 30.02 KG/M2 | HEIGHT: 66 IN | TEMPERATURE: 98.6 F | HEART RATE: 64 BPM | WEIGHT: 186.8 LBS | OXYGEN SATURATION: 98 % | DIASTOLIC BLOOD PRESSURE: 84 MMHG

## 2020-02-04 DIAGNOSIS — I10 ESSENTIAL HYPERTENSION: Primary | ICD-10-CM

## 2020-02-04 PROBLEM — R05.9 COUGH: Status: RESOLVED | Noted: 2019-05-03 | Resolved: 2020-02-04

## 2020-02-04 PROBLEM — J30.9 ALLERGIC RHINITIS: Status: RESOLVED | Noted: 2018-08-08 | Resolved: 2020-02-04

## 2020-02-04 PROCEDURE — 3074F SYST BP LT 130 MM HG: CPT | Performed by: FAMILY MEDICINE

## 2020-02-04 PROCEDURE — 3008F BODY MASS INDEX DOCD: CPT | Performed by: FAMILY MEDICINE

## 2020-02-04 PROCEDURE — 3079F DIAST BP 80-89 MM HG: CPT | Performed by: FAMILY MEDICINE

## 2020-02-04 PROCEDURE — 1036F TOBACCO NON-USER: CPT | Performed by: FAMILY MEDICINE

## 2020-02-04 PROCEDURE — 99213 OFFICE O/P EST LOW 20 MIN: CPT | Performed by: FAMILY MEDICINE

## 2020-02-04 NOTE — PROGRESS NOTES
Assessment/Plan:    No problem-specific Assessment & Plan notes found for this encounter  Diagnoses and all orders for this visit:    Essential hypertension  Stable controlled continue same medication and dose  BMI 30 0-30 9,adult  Continue lifestyle modifications and weight watchers  Follow up in 6 months or as needed        Subjective:      Patient ID: Subhash Peterson is a 60 y o  female  Hypertension  Patient is here for follow-up of elevated blood pressure  She is exercising and is adherent to a low-salt diet  Blood pressure is well controlled at home  Cardiac symptoms: none  Patient denies chest pain, chest pressure/discomfort, claudication and dyspnea  Cardiovascular risk factors: hypertension  Use of agents associated with hypertension: none  History of target organ damage: none  She has lost 12 lbs since her last appt 6 months ago she is doing weight watchers  The following portions of the patient's history were reviewed and updated as appropriate:   She  has a past medical history of Fibroids, High cholesterol, and Hypertension  She   Patient Active Problem List    Diagnosis Date Noted    Impaired fasting glucose 08/06/2019    Hypercholesteremia 06/14/2019    BMI 32 0-32 9,adult 02/06/2019    Need for influenza vaccination 02/06/2019    Screening for diabetes mellitus 02/06/2019    Need for lipid screening 02/06/2019    Need for hepatitis C screening test 02/06/2019    Encounter for gynecological examination without abnormal finding 12/03/2018    Essential hypertension 08/08/2018    Numerous moles 08/08/2018    Screening for breast cancer 08/08/2018     She  has a past surgical history that includes Vein Surgery and Colonoscopy (2018)  Her family history includes Heart disease in her mother; Prostate cancer in her father  She  reports that she has never smoked  She has never used smokeless tobacco  She reports that she drinks alcohol   She reports that she does not use drugs   Current Outpatient Medications   Medication Sig Dispense Refill    atorvastatin (LIPITOR) 10 mg tablet Take 1 tablet (10 mg total) by mouth daily for 53 days 90 tablet 3    fluticasone (FLONASE) 50 mcg/act nasal spray SPRAY 1 SPRAY INTO EACH NOSTRIL EVERY DAY 16 mL 3    hydrochlorothiazide (HYDRODIURIL) 12 5 mg tablet TAKE 1 TABLET (12 5 MG TOTAL) BY MOUTH DAILY FOR 30 DAYS 30 tablet 1    Multiple Vitamins-Minerals (MULTIVITAMIN ADULT PO) Take by mouth      Omega-3 Fatty Acids (FISH OIL) 1200 MG CAPS Take by mouth       No current facility-administered medications for this visit  Current Outpatient Medications on File Prior to Visit   Medication Sig    atorvastatin (LIPITOR) 10 mg tablet Take 1 tablet (10 mg total) by mouth daily for 53 days    fluticasone (FLONASE) 50 mcg/act nasal spray SPRAY 1 SPRAY INTO EACH NOSTRIL EVERY DAY    hydrochlorothiazide (HYDRODIURIL) 12 5 mg tablet TAKE 1 TABLET (12 5 MG TOTAL) BY MOUTH DAILY FOR 30 DAYS    Multiple Vitamins-Minerals (MULTIVITAMIN ADULT PO) Take by mouth    Omega-3 Fatty Acids (FISH OIL) 1200 MG CAPS Take by mouth    [DISCONTINUED] hydrochlorothiazide (HYDRODIURIL) 12 5 mg tablet Take 1 tablet (12 5 mg total) by mouth daily for 30 days    [DISCONTINUED] hydrochlorothiazide (HYDRODIURIL) 12 5 mg tablet TAKE 1 TABLET (12 5 MG TOTAL) BY MOUTH DAILY FOR 30 DAYS     No current facility-administered medications on file prior to visit  She is allergic to rosuvastatin and spironolactone       Review of Systems   Constitutional: Negative for activity change, appetite change, fatigue and fever  HENT: Negative for congestion and ear discharge  Respiratory: Negative for cough and shortness of breath  Cardiovascular: Negative for chest pain and palpitations  Gastrointestinal: Negative for diarrhea and nausea  Musculoskeletal: Negative for arthralgias and back pain  Skin: Negative for color change and rash     Neurological: Negative for dizziness and headaches  Psychiatric/Behavioral: Negative for agitation and behavioral problems  Objective:      /84   Pulse 64   Temp 98 6 °F (37 °C) (Tympanic)   Ht 5' 5 5" (1 664 m)   Wt 84 7 kg (186 lb 12 8 oz)   LMP  (LMP Unknown)   SpO2 98%   BMI 30 61 kg/m²          Physical Exam   Constitutional: She is oriented to person, place, and time  She appears well-developed and well-nourished  No distress  HENT:   Head: Normocephalic and atraumatic  Nose: Nose normal    Mouth/Throat: Oropharynx is clear and moist    Eyes: Pupils are equal, round, and reactive to light  Conjunctivae are normal    Cardiovascular: Normal rate, regular rhythm and normal heart sounds  No murmur heard  Pulmonary/Chest: Effort normal and breath sounds normal  No respiratory distress  She has no wheezes  Abdominal: Soft  Bowel sounds are normal  She exhibits no distension  There is no tenderness  Neurological: She is alert and oriented to person, place, and time  Skin: Skin is warm and dry  No rash noted  She is not diaphoretic  No erythema  Psychiatric: She has a normal mood and affect

## 2020-03-19 DIAGNOSIS — I10 ESSENTIAL HYPERTENSION: ICD-10-CM

## 2020-03-19 RX ORDER — HYDROCHLOROTHIAZIDE 12.5 MG/1
12.5 TABLET ORAL DAILY
Qty: 30 TABLET | Refills: 1 | Status: SHIPPED | OUTPATIENT
Start: 2020-03-19 | End: 2020-07-14

## 2020-06-16 DIAGNOSIS — J30.9 ALLERGIC RHINITIS, UNSPECIFIED SEASONALITY, UNSPECIFIED TRIGGER: ICD-10-CM

## 2020-06-16 RX ORDER — FLUTICASONE PROPIONATE 50 MCG
SPRAY, SUSPENSION (ML) NASAL
Qty: 16 ML | Refills: 3 | Status: SHIPPED | OUTPATIENT
Start: 2020-06-16 | End: 2020-12-28

## 2020-07-14 DIAGNOSIS — I10 ESSENTIAL HYPERTENSION: ICD-10-CM

## 2020-07-14 RX ORDER — HYDROCHLOROTHIAZIDE 12.5 MG/1
12.5 TABLET ORAL DAILY
Qty: 30 TABLET | Refills: 3 | Status: SHIPPED | OUTPATIENT
Start: 2020-07-14 | End: 2020-11-06 | Stop reason: SDUPTHER

## 2020-08-04 ENCOUNTER — OFFICE VISIT (OUTPATIENT)
Dept: FAMILY MEDICINE CLINIC | Facility: CLINIC | Age: 65
End: 2020-08-04
Payer: COMMERCIAL

## 2020-08-04 VITALS
TEMPERATURE: 97.9 F | HEIGHT: 66 IN | OXYGEN SATURATION: 97 % | SYSTOLIC BLOOD PRESSURE: 126 MMHG | WEIGHT: 184.8 LBS | DIASTOLIC BLOOD PRESSURE: 80 MMHG | HEART RATE: 76 BPM | BODY MASS INDEX: 29.7 KG/M2

## 2020-08-04 DIAGNOSIS — Z13.1 SCREENING FOR DIABETES MELLITUS: ICD-10-CM

## 2020-08-04 DIAGNOSIS — Z13.220 NEED FOR LIPID SCREENING: ICD-10-CM

## 2020-08-04 DIAGNOSIS — I10 ESSENTIAL HYPERTENSION: Primary | ICD-10-CM

## 2020-08-04 DIAGNOSIS — Z12.39 SCREENING FOR BREAST CANCER: ICD-10-CM

## 2020-08-04 PROBLEM — Z11.59 NEED FOR HEPATITIS C SCREENING TEST: Status: RESOLVED | Noted: 2019-02-06 | Resolved: 2020-08-04

## 2020-08-04 PROCEDURE — 3725F SCREEN DEPRESSION PERFORMED: CPT | Performed by: FAMILY MEDICINE

## 2020-08-04 PROCEDURE — 3079F DIAST BP 80-89 MM HG: CPT | Performed by: FAMILY MEDICINE

## 2020-08-04 PROCEDURE — 1036F TOBACCO NON-USER: CPT | Performed by: FAMILY MEDICINE

## 2020-08-04 PROCEDURE — 3008F BODY MASS INDEX DOCD: CPT | Performed by: FAMILY MEDICINE

## 2020-08-04 PROCEDURE — 99213 OFFICE O/P EST LOW 20 MIN: CPT | Performed by: FAMILY MEDICINE

## 2020-08-04 PROCEDURE — 3074F SYST BP LT 130 MM HG: CPT | Performed by: FAMILY MEDICINE

## 2020-08-04 NOTE — PROGRESS NOTES
Assessment/Plan:    No problem-specific Assessment & Plan notes found for this encounter  Diagnoses and all orders for this visit:    Essential hypertension  Stable controlled  Screening for breast cancer  -     Mammo screening bilateral w cad; Future    Need for lipid screening  -     Lipid panel; Future    Screening for diabetes mellitus  -     Comprehensive metabolic panel; Future      Follow up in 6 months or as needed    Subjective:      Patient ID: Jimmie العلي is a 63 y o  female  Hypertension  Patient is here for follow-up of elevated blood pressure  She is exercising and is adherent to a low-salt diet  Blood pressure is well controlled at home  Cardiac symptoms: none  Patient denies chest pain, chest pressure/discomfort, claudication, dyspnea, exertional chest pressure/discomfort, fatigue, irregular heart beat and lower extremity edema  Cardiovascular risk factors: hypertension and obesity (BMI >= 30 kg/m2)  Use of agents associated with hypertension: none  History of target organ damage: none  The following portions of the patient's history were reviewed and updated as appropriate:   She  has a past medical history of Fibroids, High cholesterol, and Hypertension  She   Patient Active Problem List    Diagnosis Date Noted    Impaired fasting glucose 08/06/2019    Hypercholesteremia 06/14/2019    BMI 32 0-32 9,adult 02/06/2019    Need for influenza vaccination 02/06/2019    Screening for diabetes mellitus 02/06/2019    Need for lipid screening 02/06/2019    Encounter for gynecological examination without abnormal finding 12/03/2018    Essential hypertension 08/08/2018    Numerous moles 08/08/2018    Screening for breast cancer 08/08/2018     She  has a past surgical history that includes Vein Surgery and Colonoscopy (2018)  Her family history includes Heart disease in her mother; Prostate cancer in her father  She  reports that she has never smoked   She has never used smokeless tobacco  She reports current alcohol use  She reports that she does not use drugs  Current Outpatient Medications   Medication Sig Dispense Refill    atorvastatin (LIPITOR) 10 mg tablet Take 1 tablet (10 mg total) by mouth daily for 53 days 90 tablet 3    fluticasone (FLONASE) 50 mcg/act nasal spray SPRAY 1 SPRAY INTO EACH NOSTRIL EVERY DAY 16 mL 3    hydrochlorothiazide (HYDRODIURIL) 12 5 mg tablet TAKE 1 TABLET (12 5 MG TOTAL) BY MOUTH DAILY FOR 30 DAYS 30 tablet 3    Multiple Vitamins-Minerals (MULTIVITAMIN ADULT PO) Take by mouth      Omega-3 Fatty Acids (FISH OIL) 1200 MG CAPS Take by mouth       No current facility-administered medications for this visit  Current Outpatient Medications on File Prior to Visit   Medication Sig    atorvastatin (LIPITOR) 10 mg tablet Take 1 tablet (10 mg total) by mouth daily for 53 days    fluticasone (FLONASE) 50 mcg/act nasal spray SPRAY 1 SPRAY INTO EACH NOSTRIL EVERY DAY    hydrochlorothiazide (HYDRODIURIL) 12 5 mg tablet TAKE 1 TABLET (12 5 MG TOTAL) BY MOUTH DAILY FOR 30 DAYS    Multiple Vitamins-Minerals (MULTIVITAMIN ADULT PO) Take by mouth    Omega-3 Fatty Acids (FISH OIL) 1200 MG CAPS Take by mouth     No current facility-administered medications on file prior to visit  She is allergic to rosuvastatin and spironolactone       Review of Systems   Constitutional: Negative for activity change, appetite change, fatigue and fever  HENT: Negative for congestion and ear discharge  Respiratory: Negative for cough and shortness of breath  Cardiovascular: Negative for chest pain and palpitations  Gastrointestinal: Negative for diarrhea and nausea  Musculoskeletal: Negative for arthralgias and back pain  Skin: Negative for color change and rash  Neurological: Negative for dizziness and headaches  Psychiatric/Behavioral: Negative for agitation and behavioral problems           Objective:      /80   Pulse 76   Temp 97 9 °F (36 6 °C) (Tympanic)   Ht 5' 5 5"   Wt 83 8 kg (184 lb 12 8 oz)   LMP  (LMP Unknown)   SpO2 97%   BMI 30 28 kg/m²          Physical Exam   Constitutional: She is oriented to person, place, and time  She appears well-developed  No distress  HENT:   Head: Normocephalic and atraumatic  Nose: Nose normal    Eyes: Pupils are equal, round, and reactive to light  Conjunctivae are normal    Cardiovascular: Normal rate, regular rhythm and normal heart sounds  No murmur heard  Pulmonary/Chest: Effort normal and breath sounds normal  No respiratory distress  She has no wheezes  Abdominal: Soft  Bowel sounds are normal  She exhibits no distension  There is no abdominal tenderness  Neurological: She is alert and oriented to person, place, and time  Skin: Skin is warm and dry  No rash noted  She is not diaphoretic  No erythema

## 2020-08-12 NOTE — PROGRESS NOTES
BMI Counseling: Body mass index is 30 28 kg/m²  The BMI is above normal  Nutrition recommendations include reducing portion sizes

## 2020-08-24 DIAGNOSIS — E78.00 HYPERCHOLESTEREMIA: ICD-10-CM

## 2020-08-24 RX ORDER — ATORVASTATIN CALCIUM 10 MG/1
TABLET, FILM COATED ORAL
Qty: 90 TABLET | Refills: 3 | Status: SHIPPED | OUTPATIENT
Start: 2020-08-24 | End: 2021-04-08

## 2020-10-02 ENCOUNTER — APPOINTMENT (OUTPATIENT)
Dept: LAB | Facility: CLINIC | Age: 65
End: 2020-10-02
Payer: COMMERCIAL

## 2020-10-02 DIAGNOSIS — Z13.1 SCREENING FOR DIABETES MELLITUS: ICD-10-CM

## 2020-10-02 DIAGNOSIS — Z13.220 NEED FOR LIPID SCREENING: ICD-10-CM

## 2020-10-02 LAB
ALBUMIN SERPL BCP-MCNC: 4.2 G/DL (ref 3.5–5)
ALP SERPL-CCNC: 73 U/L (ref 46–116)
ALT SERPL W P-5'-P-CCNC: 35 U/L (ref 12–78)
ANION GAP SERPL CALCULATED.3IONS-SCNC: 4 MMOL/L (ref 4–13)
AST SERPL W P-5'-P-CCNC: 21 U/L (ref 5–45)
BILIRUB SERPL-MCNC: 0.6 MG/DL (ref 0.2–1)
BUN SERPL-MCNC: 13 MG/DL (ref 5–25)
CALCIUM SERPL-MCNC: 9.9 MG/DL (ref 8.3–10.1)
CHLORIDE SERPL-SCNC: 100 MMOL/L (ref 100–108)
CHOLEST SERPL-MCNC: 257 MG/DL (ref 50–200)
CO2 SERPL-SCNC: 33 MMOL/L (ref 21–32)
CREAT SERPL-MCNC: 0.67 MG/DL (ref 0.6–1.3)
GFR SERPL CREATININE-BSD FRML MDRD: 94 ML/MIN/1.73SQ M
GLUCOSE P FAST SERPL-MCNC: 87 MG/DL (ref 65–99)
HDLC SERPL-MCNC: 99 MG/DL
LDLC SERPL CALC-MCNC: 146 MG/DL (ref 0–100)
NONHDLC SERPL-MCNC: 158 MG/DL
POTASSIUM SERPL-SCNC: 3.4 MMOL/L (ref 3.5–5.3)
PROT SERPL-MCNC: 7.6 G/DL (ref 6.4–8.2)
SODIUM SERPL-SCNC: 137 MMOL/L (ref 136–145)
TRIGL SERPL-MCNC: 62 MG/DL

## 2020-10-02 PROCEDURE — 80061 LIPID PANEL: CPT

## 2020-10-02 PROCEDURE — 36415 COLL VENOUS BLD VENIPUNCTURE: CPT

## 2020-10-02 PROCEDURE — 80053 COMPREHEN METABOLIC PANEL: CPT

## 2020-10-05 DIAGNOSIS — E87.6 HYPOKALEMIA: Primary | ICD-10-CM

## 2020-10-05 RX ORDER — POTASSIUM CHLORIDE 20 MEQ/1
20 TABLET, EXTENDED RELEASE ORAL DAILY
Qty: 5 TABLET | Refills: 0 | Status: SHIPPED | OUTPATIENT
Start: 2020-10-05 | End: 2021-04-09

## 2020-10-21 DIAGNOSIS — Z12.39 SCREENING FOR BREAST CANCER: ICD-10-CM

## 2020-11-06 DIAGNOSIS — I10 ESSENTIAL HYPERTENSION: ICD-10-CM

## 2020-11-06 RX ORDER — HYDROCHLOROTHIAZIDE 12.5 MG/1
12.5 TABLET ORAL DAILY
Qty: 30 TABLET | Refills: 3 | Status: SHIPPED | OUTPATIENT
Start: 2020-11-06 | End: 2021-03-04

## 2020-12-27 DIAGNOSIS — J30.9 ALLERGIC RHINITIS, UNSPECIFIED SEASONALITY, UNSPECIFIED TRIGGER: ICD-10-CM

## 2020-12-28 RX ORDER — FLUTICASONE PROPIONATE 50 MCG
SPRAY, SUSPENSION (ML) NASAL
Qty: 16 ML | Refills: 3 | Status: SHIPPED | OUTPATIENT
Start: 2020-12-28 | End: 2021-09-23

## 2020-12-31 ENCOUNTER — VBI (OUTPATIENT)
Dept: ADMINISTRATIVE | Facility: OTHER | Age: 65
End: 2020-12-31

## 2021-03-04 DIAGNOSIS — I10 ESSENTIAL HYPERTENSION: ICD-10-CM

## 2021-03-04 RX ORDER — HYDROCHLOROTHIAZIDE 12.5 MG/1
TABLET ORAL
Qty: 30 TABLET | Refills: 0 | Status: SHIPPED | OUTPATIENT
Start: 2021-03-04 | End: 2021-03-29

## 2021-03-28 DIAGNOSIS — I10 ESSENTIAL HYPERTENSION: ICD-10-CM

## 2021-03-29 RX ORDER — HYDROCHLOROTHIAZIDE 12.5 MG/1
TABLET ORAL
Qty: 30 TABLET | Refills: 0 | Status: SHIPPED | OUTPATIENT
Start: 2021-03-29 | End: 2021-04-09 | Stop reason: SDUPTHER

## 2021-04-08 DIAGNOSIS — E78.00 HYPERCHOLESTEREMIA: ICD-10-CM

## 2021-04-08 RX ORDER — ATORVASTATIN CALCIUM 10 MG/1
TABLET, FILM COATED ORAL
Qty: 90 TABLET | Refills: 3 | Status: SHIPPED | OUTPATIENT
Start: 2021-04-08 | End: 2021-04-14 | Stop reason: SDUPTHER

## 2021-04-09 ENCOUNTER — APPOINTMENT (OUTPATIENT)
Dept: LAB | Facility: CLINIC | Age: 66
End: 2021-04-09
Payer: COMMERCIAL

## 2021-04-09 ENCOUNTER — OFFICE VISIT (OUTPATIENT)
Dept: FAMILY MEDICINE CLINIC | Facility: CLINIC | Age: 66
End: 2021-04-09
Payer: COMMERCIAL

## 2021-04-09 VITALS
BODY MASS INDEX: 30.7 KG/M2 | OXYGEN SATURATION: 98 % | WEIGHT: 191 LBS | HEART RATE: 70 BPM | HEIGHT: 66 IN | TEMPERATURE: 98 F | SYSTOLIC BLOOD PRESSURE: 138 MMHG | DIASTOLIC BLOOD PRESSURE: 90 MMHG

## 2021-04-09 DIAGNOSIS — R73.01 IMPAIRED FASTING GLUCOSE: ICD-10-CM

## 2021-04-09 DIAGNOSIS — E78.00 HYPERCHOLESTEREMIA: ICD-10-CM

## 2021-04-09 DIAGNOSIS — I10 ESSENTIAL HYPERTENSION: Primary | ICD-10-CM

## 2021-04-09 DIAGNOSIS — E87.6 HYPOKALEMIA: ICD-10-CM

## 2021-04-09 LAB
ALBUMIN SERPL BCP-MCNC: 4.2 G/DL (ref 3.5–5)
ALP SERPL-CCNC: 74 U/L (ref 46–116)
ALT SERPL W P-5'-P-CCNC: 37 U/L (ref 12–78)
ANION GAP SERPL CALCULATED.3IONS-SCNC: 6 MMOL/L (ref 4–13)
AST SERPL W P-5'-P-CCNC: 26 U/L (ref 5–45)
BILIRUB SERPL-MCNC: 0.56 MG/DL (ref 0.2–1)
BUN SERPL-MCNC: 14 MG/DL (ref 5–25)
CALCIUM SERPL-MCNC: 9.8 MG/DL (ref 8.3–10.1)
CHLORIDE SERPL-SCNC: 98 MMOL/L (ref 100–108)
CHOLEST SERPL-MCNC: 230 MG/DL (ref 50–200)
CO2 SERPL-SCNC: 32 MMOL/L (ref 21–32)
CREAT SERPL-MCNC: 0.69 MG/DL (ref 0.6–1.3)
EST. AVERAGE GLUCOSE BLD GHB EST-MCNC: 120 MG/DL
GFR SERPL CREATININE-BSD FRML MDRD: 93 ML/MIN/1.73SQ M
GLUCOSE P FAST SERPL-MCNC: 103 MG/DL (ref 65–99)
HBA1C MFR BLD: 5.8 %
HDLC SERPL-MCNC: 91 MG/DL
LDLC SERPL CALC-MCNC: 123 MG/DL (ref 0–100)
NONHDLC SERPL-MCNC: 139 MG/DL
POTASSIUM SERPL-SCNC: 3.7 MMOL/L (ref 3.5–5.3)
PROT SERPL-MCNC: 7.7 G/DL (ref 6.4–8.2)
SODIUM SERPL-SCNC: 136 MMOL/L (ref 136–145)
TRIGL SERPL-MCNC: 82 MG/DL

## 2021-04-09 PROCEDURE — 80061 LIPID PANEL: CPT

## 2021-04-09 PROCEDURE — 3075F SYST BP GE 130 - 139MM HG: CPT | Performed by: FAMILY MEDICINE

## 2021-04-09 PROCEDURE — 1036F TOBACCO NON-USER: CPT | Performed by: FAMILY MEDICINE

## 2021-04-09 PROCEDURE — 83036 HEMOGLOBIN GLYCOSYLATED A1C: CPT

## 2021-04-09 PROCEDURE — 3725F SCREEN DEPRESSION PERFORMED: CPT | Performed by: FAMILY MEDICINE

## 2021-04-09 PROCEDURE — 36415 COLL VENOUS BLD VENIPUNCTURE: CPT

## 2021-04-09 PROCEDURE — 80053 COMPREHEN METABOLIC PANEL: CPT

## 2021-04-09 PROCEDURE — 3080F DIAST BP >= 90 MM HG: CPT | Performed by: FAMILY MEDICINE

## 2021-04-09 PROCEDURE — 3008F BODY MASS INDEX DOCD: CPT | Performed by: FAMILY MEDICINE

## 2021-04-09 PROCEDURE — 99213 OFFICE O/P EST LOW 20 MIN: CPT | Performed by: FAMILY MEDICINE

## 2021-04-09 RX ORDER — HYDROCHLOROTHIAZIDE 12.5 MG/1
12.5 TABLET ORAL DAILY
Qty: 30 TABLET | Refills: 1 | Status: SHIPPED | OUTPATIENT
Start: 2021-04-09 | End: 2021-05-17 | Stop reason: SDUPTHER

## 2021-04-09 NOTE — PROGRESS NOTES
BMI Counseling: Body mass index is 31 3 kg/m²  The BMI is above normal  Nutrition recommendations include 3-5 servings of fruits/vegetables daily, consuming healthier snacks and increasing intake of lean protein  Exercise recommendations include exercising 3-5 times per week

## 2021-04-09 NOTE — PROGRESS NOTES
Assessment/Plan:    No problem-specific Assessment & Plan notes found for this encounter  Diagnoses and all orders for this visit:    Essential hypertension  Elevated today  Advise to measure BP daily at home if elevated above 140/90 mmhg on 3 occasions recommend increase the medication dose to 25 mg daily  -     hydrochlorothiazide (HYDRODIURIL) 12 5 mg tablet; Take 1 tablet (12 5 mg total) by mouth daily    Hypercholesteremia  -     Lipid panel; Future    Hypokalemia  -     Comprehensive metabolic panel; Future    Impaired fasting glucose  -     HEMOGLOBIN A1C W/ EAG ESTIMATION; Future      Follow up in 1 month    Subjective:      Patient ID: Padmini Loera is a 63 y o  female  Patient is here to follow up for hypertension  Her blood pressure is elevated today  She has not been measuring her BP at home  She denies any symptoms at this time related to this  The following portions of the patient's history were reviewed and updated as appropriate:   She  has a past medical history of Fibroids, High cholesterol, and Hypertension  She   Patient Active Problem List    Diagnosis Date Noted    Impaired fasting glucose 08/06/2019    Hypercholesteremia 06/14/2019    BMI 32 0-32 9,adult 02/06/2019    Need for influenza vaccination 02/06/2019    Screening for diabetes mellitus 02/06/2019    Need for lipid screening 02/06/2019    Encounter for gynecological examination without abnormal finding 12/03/2018    Essential hypertension 08/08/2018    Numerous moles 08/08/2018    Screening for breast cancer 08/08/2018     She  has a past surgical history that includes Vein Surgery and Colonoscopy (2018)  Her family history includes Heart disease in her mother; Prostate cancer in her father  She  reports that she has never smoked  She has never used smokeless tobacco  She reports current alcohol use  She reports that she does not use drugs    Current Outpatient Medications   Medication Sig Dispense Refill  atorvastatin (LIPITOR) 10 mg tablet TAKE 1 TABLET BY MOUTH EVERY DAY 90 tablet 3    fluticasone (FLONASE) 50 mcg/act nasal spray SPRAY 1 SPRAY INTO EACH NOSTRIL EVERY DAY 16 mL 3    hydrochlorothiazide (HYDRODIURIL) 12 5 mg tablet Take 1 tablet (12 5 mg total) by mouth daily 30 tablet 1    Multiple Vitamins-Minerals (MULTIVITAMIN ADULT PO) Take by mouth      Omega-3 Fatty Acids (FISH OIL) 1200 MG CAPS Take by mouth       No current facility-administered medications for this visit  Current Outpatient Medications on File Prior to Visit   Medication Sig    atorvastatin (LIPITOR) 10 mg tablet TAKE 1 TABLET BY MOUTH EVERY DAY    fluticasone (FLONASE) 50 mcg/act nasal spray SPRAY 1 SPRAY INTO EACH NOSTRIL EVERY DAY    Multiple Vitamins-Minerals (MULTIVITAMIN ADULT PO) Take by mouth    Omega-3 Fatty Acids (FISH OIL) 1200 MG CAPS Take by mouth    [DISCONTINUED] hydrochlorothiazide (HYDRODIURIL) 12 5 mg tablet TAKE 1 TABLET BY MOUTH EVERY DAY    [DISCONTINUED] potassium chloride (K-DUR,KLOR-CON) 20 mEq tablet Take 1 tablet (20 mEq total) by mouth daily for 5 days     No current facility-administered medications on file prior to visit  She is allergic to rosuvastatin and spironolactone       Review of Systems   Constitutional: Negative for activity change, appetite change, fatigue and fever  HENT: Negative for congestion and ear discharge  Respiratory: Negative for cough and shortness of breath  Cardiovascular: Negative for chest pain and palpitations  Gastrointestinal: Negative for diarrhea and nausea  Musculoskeletal: Negative for arthralgias and back pain  Skin: Negative for color change and rash  Neurological: Negative for dizziness and headaches  Psychiatric/Behavioral: Negative for agitation and behavioral problems           Objective:      /90   Pulse 70   Temp 98 °F (36 7 °C)   Ht 5' 5 5" (1 664 m)   Wt 86 6 kg (191 lb)   LMP  (LMP Unknown)   SpO2 98%   BMI 31 30 kg/m²          Physical Exam  Constitutional:       General: She is not in acute distress  Appearance: She is well-developed  She is not diaphoretic  HENT:      Head: Normocephalic and atraumatic  Nose: Nose normal    Eyes:      Conjunctiva/sclera: Conjunctivae normal       Pupils: Pupils are equal, round, and reactive to light  Cardiovascular:      Rate and Rhythm: Normal rate and regular rhythm  Heart sounds: Normal heart sounds  No murmur  Pulmonary:      Effort: Pulmonary effort is normal  No respiratory distress  Breath sounds: Normal breath sounds  No wheezing  Abdominal:      General: Bowel sounds are normal  There is no distension  Palpations: Abdomen is soft  Tenderness: There is no abdominal tenderness  Skin:     General: Skin is warm and dry  Findings: No erythema or rash  Neurological:      Mental Status: She is alert and oriented to person, place, and time

## 2021-04-14 DIAGNOSIS — E78.00 HYPERCHOLESTEREMIA: ICD-10-CM

## 2021-04-14 RX ORDER — ATORVASTATIN CALCIUM 20 MG/1
20 TABLET, FILM COATED ORAL DAILY
Qty: 90 TABLET | Refills: 1 | Status: SHIPPED | OUTPATIENT
Start: 2021-04-14 | End: 2021-10-06

## 2021-04-14 RX ORDER — ATORVASTATIN CALCIUM 20 MG/1
10 TABLET, FILM COATED ORAL DAILY
Qty: 90 TABLET | Refills: 1 | Status: SHIPPED | OUTPATIENT
Start: 2021-04-14 | End: 2021-04-14 | Stop reason: SDUPTHER

## 2021-04-26 ENCOUNTER — ANNUAL EXAM (OUTPATIENT)
Dept: OBGYN CLINIC | Facility: CLINIC | Age: 66
End: 2021-04-26
Payer: COMMERCIAL

## 2021-04-26 VITALS — DIASTOLIC BLOOD PRESSURE: 90 MMHG | WEIGHT: 199.2 LBS | BODY MASS INDEX: 32.64 KG/M2 | SYSTOLIC BLOOD PRESSURE: 148 MMHG

## 2021-04-26 DIAGNOSIS — Z01.419 ENCOUNTER FOR GYNECOLOGICAL EXAMINATION: Primary | ICD-10-CM

## 2021-04-26 DIAGNOSIS — Z12.31 SCREENING MAMMOGRAM, ENCOUNTER FOR: ICD-10-CM

## 2021-04-26 DIAGNOSIS — Z01.419 ENCOUNTER FOR GYNECOLOGICAL EXAMINATION WITHOUT ABNORMAL FINDING: ICD-10-CM

## 2021-04-26 PROBLEM — Z12.39 SCREENING FOR BREAST CANCER: Status: RESOLVED | Noted: 2018-08-08 | Resolved: 2021-04-26

## 2021-04-26 PROBLEM — Z13.1 SCREENING FOR DIABETES MELLITUS: Status: RESOLVED | Noted: 2019-02-06 | Resolved: 2021-04-26

## 2021-04-26 PROCEDURE — S0612 ANNUAL GYNECOLOGICAL EXAMINA: HCPCS | Performed by: OBSTETRICS & GYNECOLOGY

## 2021-04-26 NOTE — PROGRESS NOTES
Assessment/Plan:    Encounter for gynecological examination without abnormal finding  Pap/HPV current  Mammogram current  Colon cancer screening current    Encourage healthy diet, exercise, Calcium 1200mg per day and at least 800 iu Vitamin D daily  Diagnoses and all orders for this visit:    Encounter for gynecological examination    Screening mammogram, encounter for  -     Mammo screening bilateral w 3d & cad; Future    Encounter for gynecological examination without abnormal finding          Subjective:      Patient ID: Spike Lopez is a 63 y o  female  Patient presents for a routine annual visit  Menarche- 17 Y/O  Last Pap Smear- 12/3/18 Neg-HPV    Mammogram-10/19/20 WNL  Colonoscopy-17 WNL (Due )    Non smoker  Social drinker  Not currently sexually active  No concerns/questions for today's visit      Walks (on treadmill) often  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 - downed branches etc   Lives on a [address]  Is starting weight watchers this week  Three children  [place], [place]  Daughter is 28 trying for pregnancy, seeing fertility specialist    Gynecologic Exam  The patient's pertinent negatives include no genital itching, genital lesions, genital odor, genital rash, pelvic pain, vaginal bleeding or vaginal discharge  The patient is experiencing no pain  Pertinent negatives include no chills, constipation, diarrhea, fever, nausea, sore throat or vomiting  She is not sexually active  The following portions of the patient's history were reviewed and updated as appropriate:   She  has a past medical history of Fibroids, High cholesterol, and Hypertension    She   Patient Active Problem List    Diagnosis Date Noted    Impaired fasting glucose 2019    Hypercholesteremia 2019    BMI 32 0-32 9,adult 2019    Encounter for gynecological examination without abnormal finding 2018    Essential hypertension 2018    Numerous moles 08/08/2018     She  has a past surgical history that includes Vein Surgery and Colonoscopy (2018)  Her family history includes Heart disease in her mother; Prostate cancer in her father  She  reports that she has never smoked  She has never used smokeless tobacco  She reports current alcohol use  She reports that she does not use drugs  Current Outpatient Medications   Medication Sig Dispense Refill    atorvastatin (LIPITOR) 20 mg tablet Take 1 tablet (20 mg total) by mouth daily 90 tablet 1    fluticasone (FLONASE) 50 mcg/act nasal spray SPRAY 1 SPRAY INTO EACH NOSTRIL EVERY DAY 16 mL 3    hydrochlorothiazide (HYDRODIURIL) 12 5 mg tablet Take 1 tablet (12 5 mg total) by mouth daily 30 tablet 1    Multiple Vitamins-Minerals (MULTIVITAMIN ADULT PO) Take by mouth      Omega-3 Fatty Acids (FISH OIL) 1200 MG CAPS Take by mouth       No current facility-administered medications for this visit  Current Outpatient Medications on File Prior to Visit   Medication Sig    atorvastatin (LIPITOR) 20 mg tablet Take 1 tablet (20 mg total) by mouth daily    fluticasone (FLONASE) 50 mcg/act nasal spray SPRAY 1 SPRAY INTO EACH NOSTRIL EVERY DAY    hydrochlorothiazide (HYDRODIURIL) 12 5 mg tablet Take 1 tablet (12 5 mg total) by mouth daily    Multiple Vitamins-Minerals (MULTIVITAMIN ADULT PO) Take by mouth    Omega-3 Fatty Acids (FISH OIL) 1200 MG CAPS Take by mouth     No current facility-administered medications on file prior to visit  She is allergic to rosuvastatin and spironolactone       Review of Systems   Constitutional: Negative for activity change, appetite change, chills, fatigue and fever  HENT: Negative for rhinorrhea, sneezing and sore throat  Eyes: Negative for visual disturbance  Respiratory: Negative for cough, shortness of breath and wheezing  Cardiovascular: Negative for chest pain, palpitations and leg swelling     Gastrointestinal: Negative for abdominal distention, constipation, diarrhea, nausea and vomiting  Genitourinary: Negative for difficulty urinating, pelvic pain and vaginal discharge  Neurological: Negative for syncope and light-headedness  Objective:      /90   Wt 90 4 kg (199 lb 3 2 oz)   LMP  (LMP Unknown)   BMI 32 64 kg/m²          Physical Exam  Chest:      Breasts: Breasts are symmetrical          Right: No inverted nipple, mass, nipple discharge, skin change or tenderness  Left: No inverted nipple, mass, nipple discharge, skin change or tenderness  Genitourinary:     Labia:         Right: No rash, tenderness, lesion or injury  Left: No rash, tenderness, lesion or injury  Vagina: Normal  No vaginal discharge, tenderness or bleeding  Cervix: No cervical motion tenderness, discharge or friability  Uterus: Not deviated, not enlarged, not fixed and not tender  Adnexa:         Right: No mass, tenderness or fullness  Left: No mass, tenderness or fullness  Neurological:      Mental Status: She is alert and oriented to person, place, and time

## 2021-04-26 NOTE — ASSESSMENT & PLAN NOTE
Pap/HPV current  Mammogram current  Colon cancer screening current    Encourage healthy diet, exercise, Calcium 1200mg per day and at least 800 iu Vitamin D daily

## 2021-04-26 NOTE — PATIENT INSTRUCTIONS
Wellness Visit for Adults   WHAT YOU NEED TO KNOW:   What is a wellness visit? A wellness visit is when you see your healthcare provider to get screened for health problems  Your healthcare provider will also give you advice on how to stay healthy  Write down your questions so you remember to ask them  Ask your healthcare provider how often you should have a wellness visit  What happens at a wellness visit? Your healthcare provider will ask about your health, and your family history of health problems  This includes high blood pressure, heart disease, and cancer  He or she will ask if you have symptoms that concern you, if you smoke, and about your mood  You may also be asked about your intake of medicines, supplements, food, and alcohol  Any of the following may be done:  · Your weight  will be checked  Your height may also be checked so your body mass index (BMI) can be calculated  Your BMI shows if you are at a healthy weight  · Your blood pressure  and heart rate will be checked  Your temperature may also be checked  · Blood and urine tests  may be done  Blood tests may be done to check your cholesterol levels  Abnormal cholesterol levels increase your risk for heart disease and stroke  You may also need a blood or urine test to check for diabetes if you are at increased risk  Urine tests may be done to look for signs of an infection or kidney disease  · A physical exam  includes checking your heartbeat and lungs with a stethoscope  Your healthcare provider may also check your skin to look for sun damage  · Screening tests  may be recommended  A screening test is done to check for diseases that may not cause symptoms  The screening tests you may need depend on your age, gender, family history, and lifestyle habits  For example, colorectal screening may be recommended if you are 48years old or older  What screening tests do I need if I am a woman?    · A Pap smear  is used to screen for cervical cancer  Pap smears are usually done every 3 to 5 years depending on your age  You may need them more often if you have had abnormal Pap smear test results in the past  Ask your healthcare provider how often you should have a Pap smear  · A mammogram  is an x-ray of your breasts to screen for breast cancer  Experts recommend mammograms every 2 years starting at age 48 years  You may need a mammogram at age 52 years or younger if you have an increased risk for breast cancer  Talk to your healthcare provider about when you should start having mammograms and how often you need them  What vaccines might I need? · Get an influenza vaccine  every year  The influenza vaccine protects you from the flu  Several types of viruses cause the flu  The viruses change over time, so new vaccines are made each year  · Get a tetanus-diphtheria (Td) booster vaccine  every 10 years  This vaccine protects you against tetanus and diphtheria  Tetanus is a severe infection that may cause painful muscle spasms and lockjaw  Diphtheria is a severe bacterial infection that causes a thick covering in the back of your mouth and throat  · Get a human papillomavirus (HPV) vaccine  if you are female and aged 23 to 32 or male 23 to 24 and never received it  This vaccine protects you from HPV infection  HPV is the most common infection spread by sexual contact  HPV may also cause vaginal, penile, and anal cancers  · Get a pneumococcal vaccine  if you are aged 72 years or older  The pneumococcal vaccine is an injection given to protect you from pneumococcal disease  Pneumococcal disease is an infection caused by pneumococcal bacteria  The infection may cause pneumonia, meningitis, or an ear infection  · Get a shingles vaccine  if you are 60 or older, even if you have had shingles before  The shingles vaccine is an injection to protect you from the varicella-zoster virus  This is the same virus that causes chickenpox   Shingles is a painful rash that develops in people who had chickenpox or have been exposed to the virus  How can I eat healthy? My Plate is a model for planning healthy meals  It shows the types and amounts of foods that should go on your plate  Fruits and vegetables make up about half of your plate, and grains and protein make up the other half  A serving of dairy is included on the side of your plate  The amount of calories and serving sizes you need depends on your age, gender, weight, and height  Examples of healthy foods are listed below:  · Eat a variety of vegetables  such as dark green, red, and orange vegetables  You can also include canned vegetables low in sodium (salt) and frozen vegetables without added butter or sauces  · Eat a variety of fresh fruits , canned fruit in 100% juice, frozen fruit, and dried fruit  · Include whole grains  At least half of the grains you eat should be whole grains  Examples include whole-wheat bread, wheat pasta, brown rice, and whole-grain cereals such as oatmeal     · Eat a variety of protein foods such as seafood (fish and shellfish), lean meat, and poultry without skin (turkey and chicken)  Examples of lean meats include pork leg, shoulder, or tenderloin, and beef round, sirloin, tenderloin, and extra lean ground beef  Other protein foods include eggs and egg substitutes, beans, peas, soy products, nuts, and seeds  · Choose low-fat dairy products such as skim or 1% milk or low-fat yogurt, cheese, and cottage cheese  · Limit unhealthy fats  such as butter, hard margarine, and shortening  How much exercise do I need? Exercise at least 30 minutes per day on most days of the week  Some examples of exercise include walking, biking, dancing, and swimming  You can also fit in more physical activity by taking the stairs instead of the elevator or parking farther away from stores  Include muscle strengthening activities 2 days each week   Regular exercise provides many health benefits  It helps you manage your weight, and decreases your risk for type 2 diabetes, heart disease, stroke, and high blood pressure  Exercise can also help improve your mood  Ask your healthcare provider about the best exercise plan for you  What are some general health and safety guidelines I should follow? · Do not smoke  Nicotine and other chemicals in cigarettes and cigars can cause lung damage  Ask your healthcare provider for information if you currently smoke and need help to quit  E-cigarettes or smokeless tobacco still contain nicotine  Talk to your healthcare provider before you use these products  · Limit alcohol  A drink of alcohol is 12 ounces of beer, 5 ounces of wine, or 1½ ounces of liquor  · Lose weight, if needed  Being overweight increases your risk of certain health conditions  These include heart disease, high blood pressure, type 2 diabetes, and certain types of cancer  · Protect your skin  Do not sunbathe or use tanning beds  Use sunscreen with a SPF 15 or higher  Apply sunscreen at least 15 minutes before you go outside  Reapply sunscreen every 2 hours  Wear protective clothing, hats, and sunglasses when you are outside  · Drive safely  Always wear your seatbelt  Make sure everyone in your car wears a seatbelt  A seatbelt can save your life if you are in an accident  Do not use your cell phone when you are driving  This could distract you and cause an accident  Pull over if you need to make a call or send a text message  · Practice safe sex  Use latex condoms if are sexually active and have more than one partner  Your healthcare provider may recommend screening tests for sexually transmitted infections (STIs)  · Wear helmets, lifejackets, and protective gear  Always wear a helmet when you ride a bike or motorcycle, go skiing, or play sports that could cause a head injury  Wear protective equipment when you play sports   Wear a lifejacket when you are on a boat or doing water sports  CARE AGREEMENT:   You have the right to help plan your care  Learn about your health condition and how it may be treated  Discuss treatment options with your healthcare providers to decide what care you want to receive  You always have the right to refuse treatment  The above information is an  only  It is not intended as medical advice for individual conditions or treatments  Talk to your doctor, nurse or pharmacist before following any medical regimen to see if it is safe and effective for you  © Copyright [address] Information is for End User's use only and may not be sold, redistributed or otherwise used for commercial purposes   All illustrations and images included in CareNotes® are the copyrighted property of [employer]  or [address] [place] [address]

## 2021-05-03 ENCOUNTER — RA CDI HCC (OUTPATIENT)
Dept: OTHER | Facility: HOSPITAL | Age: 66
End: 2021-05-03

## 2021-05-03 NOTE — PROGRESS NOTES
[facility]  coding opportunities          Chart reviewed, no opportunity found: CHART REVIEWED, NO OPPORTUNITY FOUND              Patients insurance company:  [employer] [facility] (Medicare Advantage and Commercial)

## 2021-05-10 ENCOUNTER — RA CDI HCC (OUTPATIENT)
Dept: OTHER | Facility: HOSPITAL | Age: 66
End: 2021-05-10

## 2021-05-10 NOTE — PROGRESS NOTES
[facility]  coding opportunities          Chart reviewed, no opportunity found: CHART REVIEWED, NO OPPORTUNITY FOUND              Patients insurance company:  [employer] [facility] (Medicare Advantage and Commercial)

## 2021-05-17 ENCOUNTER — OFFICE VISIT (OUTPATIENT)
Dept: FAMILY MEDICINE CLINIC | Facility: CLINIC | Age: 66
End: 2021-05-17
Payer: COMMERCIAL

## 2021-05-17 VITALS
TEMPERATURE: 98.7 F | HEIGHT: 66 IN | WEIGHT: 192.8 LBS | HEART RATE: 80 BPM | OXYGEN SATURATION: 99 % | SYSTOLIC BLOOD PRESSURE: 146 MMHG | DIASTOLIC BLOOD PRESSURE: 88 MMHG | BODY MASS INDEX: 30.98 KG/M2

## 2021-05-17 DIAGNOSIS — I10 ESSENTIAL HYPERTENSION: ICD-10-CM

## 2021-05-17 PROCEDURE — 99213 OFFICE O/P EST LOW 20 MIN: CPT | Performed by: FAMILY MEDICINE

## 2021-05-17 RX ORDER — HYDROCHLOROTHIAZIDE 25 MG/1
25 TABLET ORAL DAILY
Qty: 30 TABLET | Refills: 3 | Status: SHIPPED | OUTPATIENT
Start: 2021-05-17 | End: 2021-09-09

## 2021-05-17 NOTE — PROGRESS NOTES
Assessment/Plan:    No problem-specific Assessment & Plan notes found for this encounter  Diagnoses and all orders for this visit:    Essential hypertension  After discussing risks and benefits of medication along with side effects will increase HCTZ to 25 mg daily  -     hydrochlorothiazide (HYDRODIURIL) 25 mg tablet; Take 1 tablet (25 mg total) by mouth daily      Follow up in 6 weeks    Subjective:      Patient ID: Fadumo Gifford is a 61 y o  female  Patient is here to follow up on hypertension  She denies any symptoms related to this  She has been taking hydrochlorothiazide 12 5 mg once daily  The following portions of the patient's history were reviewed and updated as appropriate:   She  has a past medical history of Fibroids, High cholesterol, and Hypertension  She   Patient Active Problem List    Diagnosis Date Noted    Impaired fasting glucose 08/06/2019    Hypercholesteremia 06/14/2019    BMI 32 0-32 9,adult 02/06/2019    Encounter for gynecological examination without abnormal finding 12/03/2018    Essential hypertension 08/08/2018    Numerous moles 08/08/2018     She  has a past surgical history that includes Vein Surgery and Colonoscopy (2018)  Her family history includes Heart disease in her mother; Prostate cancer in her father  She  reports that she has never smoked  She has never used smokeless tobacco  She reports current alcohol use  She reports that she does not use drugs    Current Outpatient Medications   Medication Sig Dispense Refill    atorvastatin (LIPITOR) 20 mg tablet Take 1 tablet (20 mg total) by mouth daily 90 tablet 1    fluticasone (FLONASE) 50 mcg/act nasal spray SPRAY 1 SPRAY INTO EACH NOSTRIL EVERY DAY 16 mL 3    hydrochlorothiazide (HYDRODIURIL) 25 mg tablet Take 1 tablet (25 mg total) by mouth daily 30 tablet 3    Multiple Vitamins-Minerals (MULTIVITAMIN ADULT PO) Take by mouth      Omega-3 Fatty Acids (FISH OIL) 1200 MG CAPS Take by mouth       No current facility-administered medications for this visit  Current Outpatient Medications on File Prior to Visit   Medication Sig    atorvastatin (LIPITOR) 20 mg tablet Take 1 tablet (20 mg total) by mouth daily    fluticasone (FLONASE) 50 mcg/act nasal spray SPRAY 1 SPRAY INTO EACH NOSTRIL EVERY DAY    Multiple Vitamins-Minerals (MULTIVITAMIN ADULT PO) Take by mouth    Omega-3 Fatty Acids (FISH OIL) 1200 MG CAPS Take by mouth    [DISCONTINUED] hydrochlorothiazide (HYDRODIURIL) 12 5 mg tablet Take 1 tablet (12 5 mg total) by mouth daily     No current facility-administered medications on file prior to visit  She is allergic to rosuvastatin and spironolactone       Review of Systems   Constitutional: Negative for activity change, appetite change, fatigue and fever  HENT: Negative for congestion and ear discharge  Respiratory: Negative for cough and shortness of breath  Cardiovascular: Negative for chest pain and palpitations  Gastrointestinal: Negative for diarrhea and nausea  Musculoskeletal: Negative for arthralgias and back pain  Skin: Negative for color change and rash  Neurological: Negative for dizziness and headaches  Psychiatric/Behavioral: Negative for agitation and behavioral problems  Objective:      /88 (BP Location: Left arm, Patient Position: Sitting, Cuff Size: Standard)   Pulse 80   Temp 98 7 °F (37 1 °C)   Ht 5' 5 5" (1 664 m)   Wt 87 5 kg (192 lb 12 8 oz)   LMP  (LMP Unknown)   SpO2 99%   BMI 31 60 kg/m²          Physical Exam  Constitutional:       General: She is not in acute distress  Appearance: She is well-developed  She is not diaphoretic  HENT:      Head: Normocephalic and atraumatic  Nose: Nose normal    Eyes:      Conjunctiva/sclera: Conjunctivae normal       Pupils: Pupils are equal, round, and reactive to light  Cardiovascular:      Rate and Rhythm: Normal rate and regular rhythm  Heart sounds: Normal heart sounds   No murmur  Pulmonary:      Effort: Pulmonary effort is normal  No respiratory distress  Breath sounds: Normal breath sounds  No wheezing  Abdominal:      General: Bowel sounds are normal  There is no distension  Palpations: Abdomen is soft  Tenderness: There is no abdominal tenderness  Skin:     General: Skin is warm and dry  Findings: No erythema or rash  Neurological:      Mental Status: She is alert and oriented to person, place, and time

## 2021-06-07 ENCOUNTER — RA CDI HCC (OUTPATIENT)
Dept: OTHER | Facility: HOSPITAL | Age: 66
End: 2021-06-07

## 2021-06-07 NOTE — PROGRESS NOTES
[facility] 75  coding opportunities          Chart reviewed, no opportunity found: CHART REVIEWED, NO OPPORTUNITY FOUND              Patients insurance company: [employer] (Medicare Advantage and Commercial)

## 2021-06-14 ENCOUNTER — OFFICE VISIT (OUTPATIENT)
Dept: FAMILY MEDICINE CLINIC | Facility: CLINIC | Age: 66
End: 2021-06-14
Payer: COMMERCIAL

## 2021-06-14 VITALS
SYSTOLIC BLOOD PRESSURE: 160 MMHG | OXYGEN SATURATION: 97 % | HEIGHT: 66 IN | TEMPERATURE: 97.7 F | BODY MASS INDEX: 31.12 KG/M2 | WEIGHT: 193.6 LBS | HEART RATE: 65 BPM | DIASTOLIC BLOOD PRESSURE: 98 MMHG

## 2021-06-14 DIAGNOSIS — I10 ESSENTIAL HYPERTENSION: Primary | ICD-10-CM

## 2021-06-14 PROCEDURE — 3077F SYST BP >= 140 MM HG: CPT | Performed by: FAMILY MEDICINE

## 2021-06-14 PROCEDURE — 3008F BODY MASS INDEX DOCD: CPT | Performed by: FAMILY MEDICINE

## 2021-06-14 PROCEDURE — 99214 OFFICE O/P EST MOD 30 MIN: CPT | Performed by: FAMILY MEDICINE

## 2021-06-14 PROCEDURE — 3080F DIAST BP >= 90 MM HG: CPT | Performed by: FAMILY MEDICINE

## 2021-06-14 PROCEDURE — 1036F TOBACCO NON-USER: CPT | Performed by: FAMILY MEDICINE

## 2021-06-14 RX ORDER — LOSARTAN POTASSIUM 50 MG/1
50 TABLET ORAL DAILY
Qty: 30 TABLET | Refills: 1 | Status: SHIPPED | OUTPATIENT
Start: 2021-06-14 | End: 2021-07-06

## 2021-06-14 NOTE — PROGRESS NOTES
Assessment/Plan:    No problem-specific Assessment & Plan notes found for this encounter  Diagnoses and all orders for this visit:    Essential hypertension  After discussing risks and benefits of medication along with side effects will add the following:  -     losartan (COZAAR) 50 mg tablet; Take 1 tablet (50 mg total) by mouth daily      Follow up in 3 weeks    Subjective:      Patient ID: Radha Ha is a 61 y o  female  Patient is here to follow up for hypertension  She has been taking hydrochlorothiazide daily no side effects  Does not measure her BP daily  denies any symptoms from elevated BP  The following portions of the patient's history were reviewed and updated as appropriate:   She  has a past medical history of Fibroids, High cholesterol, and Hypertension  She   Patient Active Problem List    Diagnosis Date Noted    Impaired fasting glucose 08/06/2019    Hypercholesteremia 06/14/2019    BMI 32 0-32 9,adult 02/06/2019    Encounter for gynecological examination without abnormal finding 12/03/2018    Essential hypertension 08/08/2018    Numerous moles 08/08/2018     She  has a past surgical history that includes Vein Surgery and Colonoscopy (2018)  Her family history includes Heart disease in her mother; Prostate cancer in her father  She  reports that she has never smoked  She has never used smokeless tobacco  She reports current alcohol use  She reports that she does not use drugs    Current Outpatient Medications   Medication Sig Dispense Refill    atorvastatin (LIPITOR) 20 mg tablet Take 1 tablet (20 mg total) by mouth daily 90 tablet 1    fluticasone (FLONASE) 50 mcg/act nasal spray SPRAY 1 SPRAY INTO EACH NOSTRIL EVERY DAY 16 mL 3    hydrochlorothiazide (HYDRODIURIL) 25 mg tablet Take 1 tablet (25 mg total) by mouth daily 30 tablet 3    losartan (COZAAR) 50 mg tablet Take 1 tablet (50 mg total) by mouth daily 30 tablet 1    Multiple Vitamins-Minerals (MULTIVITAMIN ADULT PO) Take by mouth      Omega-3 Fatty Acids (FISH OIL) 1200 MG CAPS Take by mouth       No current facility-administered medications for this visit  Current Outpatient Medications on File Prior to Visit   Medication Sig    atorvastatin (LIPITOR) 20 mg tablet Take 1 tablet (20 mg total) by mouth daily    fluticasone (FLONASE) 50 mcg/act nasal spray SPRAY 1 SPRAY INTO EACH NOSTRIL EVERY DAY    hydrochlorothiazide (HYDRODIURIL) 25 mg tablet Take 1 tablet (25 mg total) by mouth daily    Multiple Vitamins-Minerals (MULTIVITAMIN ADULT PO) Take by mouth    Omega-3 Fatty Acids (FISH OIL) 1200 MG CAPS Take by mouth     No current facility-administered medications on file prior to visit  She is allergic to rosuvastatin and spironolactone       Review of Systems   Constitutional: Negative for activity change, appetite change, fatigue and fever  HENT: Negative for congestion and ear discharge  Respiratory: Negative for cough and shortness of breath  Cardiovascular: Negative for chest pain and palpitations  Gastrointestinal: Negative for diarrhea and nausea  Musculoskeletal: Negative for arthralgias and back pain  Skin: Negative for color change and rash  Neurological: Negative for dizziness and headaches  Psychiatric/Behavioral: Negative for agitation and behavioral problems  Objective:      /98   Pulse 65   Temp 97 7 °F (36 5 °C)   Ht 5' 5 5" (1 664 m)   Wt 87 8 kg (193 lb 9 6 oz)   LMP  (LMP Unknown)   SpO2 97%   BMI 31 73 kg/m²          Physical Exam  Constitutional:       General: She is not in acute distress  Appearance: She is well-developed  She is not diaphoretic  HENT:      Head: Normocephalic and atraumatic  Nose: Nose normal    Eyes:      Conjunctiva/sclera: Conjunctivae normal       Pupils: Pupils are equal, round, and reactive to light  Cardiovascular:      Rate and Rhythm: Normal rate and regular rhythm  Heart sounds: Normal heart sounds  No murmur heard  Pulmonary:      Effort: Pulmonary effort is normal  No respiratory distress  Breath sounds: Normal breath sounds  No wheezing  Abdominal:      General: Bowel sounds are normal  There is no distension  Palpations: Abdomen is soft  Tenderness: There is no abdominal tenderness  Skin:     General: Skin is warm and dry  Findings: No erythema or rash  Neurological:      Mental Status: She is alert and oriented to person, place, and time

## 2021-07-01 ENCOUNTER — RA CDI HCC (OUTPATIENT)
Dept: OTHER | Facility: HOSPITAL | Age: 66
End: 2021-07-01

## 2021-07-01 NOTE — PROGRESS NOTES
[facility] 75  coding opportunities          Chart reviewed, no opportunity found: CHART REVIEWED, NO OPPORTUNITY FOUND                     Patients insurance company: [employer] (Medicare Advantage and Commercial)

## 2021-07-06 DIAGNOSIS — I10 ESSENTIAL HYPERTENSION: ICD-10-CM

## 2021-07-06 RX ORDER — LOSARTAN POTASSIUM 50 MG/1
TABLET ORAL
Qty: 30 TABLET | Refills: 1 | Status: SHIPPED | OUTPATIENT
Start: 2021-07-06 | End: 2021-07-13 | Stop reason: SDUPTHER

## 2021-07-13 ENCOUNTER — OFFICE VISIT (OUTPATIENT)
Dept: FAMILY MEDICINE CLINIC | Facility: CLINIC | Age: 66
End: 2021-07-13
Payer: COMMERCIAL

## 2021-07-13 ENCOUNTER — TELEPHONE (OUTPATIENT)
Dept: FAMILY MEDICINE CLINIC | Facility: CLINIC | Age: 66
End: 2021-07-13

## 2021-07-13 VITALS
WEIGHT: 194 LBS | OXYGEN SATURATION: 95 % | SYSTOLIC BLOOD PRESSURE: 150 MMHG | BODY MASS INDEX: 31.18 KG/M2 | TEMPERATURE: 97 F | HEART RATE: 70 BPM | HEIGHT: 66 IN | DIASTOLIC BLOOD PRESSURE: 90 MMHG

## 2021-07-13 DIAGNOSIS — I10 ESSENTIAL HYPERTENSION: Primary | ICD-10-CM

## 2021-07-13 DIAGNOSIS — I10 ESSENTIAL HYPERTENSION: ICD-10-CM

## 2021-07-13 PROCEDURE — 99213 OFFICE O/P EST LOW 20 MIN: CPT | Performed by: FAMILY MEDICINE

## 2021-07-13 PROCEDURE — 3080F DIAST BP >= 90 MM HG: CPT | Performed by: FAMILY MEDICINE

## 2021-07-13 PROCEDURE — 3077F SYST BP >= 140 MM HG: CPT | Performed by: FAMILY MEDICINE

## 2021-07-13 PROCEDURE — 3008F BODY MASS INDEX DOCD: CPT | Performed by: FAMILY MEDICINE

## 2021-07-13 PROCEDURE — 1036F TOBACCO NON-USER: CPT | Performed by: FAMILY MEDICINE

## 2021-07-13 RX ORDER — LOSARTAN POTASSIUM 100 MG/1
100 TABLET ORAL DAILY
Qty: 30 TABLET | Refills: 3 | Status: SHIPPED | OUTPATIENT
Start: 2021-07-13 | End: 2021-07-16 | Stop reason: SDUPTHER

## 2021-07-13 NOTE — PROGRESS NOTES
Assessment/Plan:    No problem-specific Assessment & Plan notes found for this encounter  Diagnoses and all orders for this visit:    Essential hypertension  Elevated  Patient advise to call with the exact dose of lsaratn that she is taking  Recommend increasing dose if 25 mg to 50 mg if taking 50 mg then 100 mg  Follow up in 1 month        Subjective:      Patient ID: Bess Crane is a 61 y o  female  Patient is here to follow up for hypertension  She denies any symptoms related to her hypertension  She has been taking her medications daily  She is not sure if she is taking losartan 25 mg or 50 mg  She is taking hydrochlorothiazide 25 mg daily however  The following portions of the patient's history were reviewed and updated as appropriate:   She  has a past medical history of Fibroids, High cholesterol, and Hypertension  She   Patient Active Problem List    Diagnosis Date Noted    Impaired fasting glucose 08/06/2019    Hypercholesteremia 06/14/2019    BMI 32 0-32 9,adult 02/06/2019    Encounter for gynecological examination without abnormal finding 12/03/2018    Essential hypertension 08/08/2018    Numerous moles 08/08/2018     She  has a past surgical history that includes Vein Surgery and Colonoscopy (2018)  Her family history includes Heart disease in her mother; Prostate cancer in her father  She  reports that she has never smoked  She has never used smokeless tobacco  She reports current alcohol use  She reports that she does not use drugs    Current Outpatient Medications   Medication Sig Dispense Refill    atorvastatin (LIPITOR) 20 mg tablet Take 1 tablet (20 mg total) by mouth daily 90 tablet 1    fluticasone (FLONASE) 50 mcg/act nasal spray SPRAY 1 SPRAY INTO EACH NOSTRIL EVERY DAY 16 mL 3    hydrochlorothiazide (HYDRODIURIL) 25 mg tablet Take 1 tablet (25 mg total) by mouth daily 30 tablet 3    losartan (COZAAR) 50 mg tablet TAKE 1 TABLET BY MOUTH EVERY DAY 30 tablet 1  Multiple Vitamins-Minerals (MULTIVITAMIN ADULT PO) Take by mouth      Omega-3 Fatty Acids (FISH OIL) 1200 MG CAPS Take by mouth       No current facility-administered medications for this visit  Current Outpatient Medications on File Prior to Visit   Medication Sig    atorvastatin (LIPITOR) 20 mg tablet Take 1 tablet (20 mg total) by mouth daily    fluticasone (FLONASE) 50 mcg/act nasal spray SPRAY 1 SPRAY INTO EACH NOSTRIL EVERY DAY    hydrochlorothiazide (HYDRODIURIL) 25 mg tablet Take 1 tablet (25 mg total) by mouth daily    losartan (COZAAR) 50 mg tablet TAKE 1 TABLET BY MOUTH EVERY DAY    Multiple Vitamins-Minerals (MULTIVITAMIN ADULT PO) Take by mouth    Omega-3 Fatty Acids (FISH OIL) 1200 MG CAPS Take by mouth     No current facility-administered medications on file prior to visit  She is allergic to rosuvastatin and spironolactone       Review of Systems   Constitutional: Negative for activity change, appetite change, fatigue and fever  HENT: Negative for congestion and ear discharge  Respiratory: Negative for cough and shortness of breath  Cardiovascular: Negative for chest pain and palpitations  Gastrointestinal: Negative for diarrhea and nausea  Musculoskeletal: Negative for arthralgias and back pain  Skin: Negative for color change and rash  Neurological: Negative for dizziness and headaches  Psychiatric/Behavioral: Negative for agitation and behavioral problems  Objective:      /90   Pulse 70   Temp (!) 97 °F (36 1 °C)   Ht 5' 5 5" (1 664 m)   Wt 88 kg (194 lb)   LMP  (LMP Unknown)   SpO2 95%   BMI 31 79 kg/m²          Physical Exam  Constitutional:       General: She is not in acute distress  Appearance: She is well-developed  She is not diaphoretic  HENT:      Head: Normocephalic and atraumatic  Nose: Nose normal    Eyes:      Conjunctiva/sclera: Conjunctivae normal       Pupils: Pupils are equal, round, and reactive to light  Cardiovascular:      Rate and Rhythm: Normal rate and regular rhythm  Heart sounds: Normal heart sounds  No murmur heard  Pulmonary:      Effort: Pulmonary effort is normal  No respiratory distress  Breath sounds: Normal breath sounds  No wheezing  Abdominal:      General: Bowel sounds are normal  There is no distension  Palpations: Abdomen is soft  Tenderness: There is no abdominal tenderness  Skin:     General: Skin is warm and dry  Findings: No erythema or rash  Neurological:      Mental Status: She is alert and oriented to person, place, and time

## 2021-07-13 NOTE — TELEPHONE ENCOUNTER
Pt notified  Pt states she took 50mg this morning and then 50mg later this afternoon and 1 hour after taking the second dose she got lightheaded   Is that normal?

## 2021-07-15 NOTE — TELEPHONE ENCOUNTER
7/15 pt notified - can you please fix med list to show she only takes 50mg losartan in the am  Thanks shane

## 2021-07-16 DIAGNOSIS — I10 ESSENTIAL HYPERTENSION: ICD-10-CM

## 2021-07-16 RX ORDER — LOSARTAN POTASSIUM 100 MG/1
50 TABLET ORAL DAILY
Qty: 30 TABLET | Refills: 3
Start: 2021-07-16 | End: 2021-07-16

## 2021-07-16 RX ORDER — LOSARTAN POTASSIUM 50 MG/1
50 TABLET ORAL DAILY
Qty: 30 TABLET | Refills: 0
Start: 2021-07-16 | End: 2021-07-30

## 2021-07-28 ENCOUNTER — TELEPHONE (OUTPATIENT)
Dept: FAMILY MEDICINE CLINIC | Facility: CLINIC | Age: 66
End: 2021-07-28

## 2021-07-28 PROCEDURE — U0003 INFECTIOUS AGENT DETECTION BY NUCLEIC ACID (DNA OR RNA); SEVERE ACUTE RESPIRATORY SYNDROME CORONAVIRUS 2 (SARS-COV-2) (CORONAVIRUS DISEASE [COVID-19]), AMPLIFIED PROBE TECHNIQUE, MAKING USE OF HIGH THROUGHPUT TECHNOLOGIES AS DESCRIBED BY CMS-2020-01-R: HCPCS | Performed by: PHYSICIAN ASSISTANT

## 2021-07-28 PROCEDURE — U0005 INFEC AGEN DETEC AMPLI PROBE: HCPCS | Performed by: PHYSICIAN ASSISTANT

## 2021-07-28 NOTE — TELEPHONE ENCOUNTER
She is having mild cold symptoms   just tested positive for Covid and he is going to proceed to the hospital  She would like to be tested

## 2021-07-30 DIAGNOSIS — I10 ESSENTIAL HYPERTENSION: ICD-10-CM

## 2021-07-30 RX ORDER — LOSARTAN POTASSIUM 50 MG/1
TABLET ORAL
Qty: 30 TABLET | Refills: 1 | Status: SHIPPED | OUTPATIENT
Start: 2021-07-30 | End: 2021-09-01

## 2021-08-02 ENCOUNTER — TELEPHONE (OUTPATIENT)
Dept: FAMILY MEDICINE CLINIC | Facility: CLINIC | Age: 66
End: 2021-08-02

## 2021-08-09 ENCOUNTER — RA CDI HCC (OUTPATIENT)
Dept: OTHER | Facility: HOSPITAL | Age: 66
End: 2021-08-09

## 2021-08-09 NOTE — PROGRESS NOTES
[facility]  coding opportunities          Chart reviewed, no opportunity found: CHART REVIEWED, NO OPPORTUNITY FOUND                     Patients insurance company:  [employer] (Medicare Advantage and Commercial)

## 2021-08-17 ENCOUNTER — OFFICE VISIT (OUTPATIENT)
Dept: FAMILY MEDICINE CLINIC | Facility: CLINIC | Age: 66
End: 2021-08-17
Payer: COMMERCIAL

## 2021-08-17 VITALS
DIASTOLIC BLOOD PRESSURE: 88 MMHG | WEIGHT: 190 LBS | HEART RATE: 78 BPM | OXYGEN SATURATION: 95 % | BODY MASS INDEX: 30.53 KG/M2 | TEMPERATURE: 97.8 F | SYSTOLIC BLOOD PRESSURE: 138 MMHG | HEIGHT: 66 IN

## 2021-08-17 DIAGNOSIS — I10 ESSENTIAL HYPERTENSION: ICD-10-CM

## 2021-08-17 DIAGNOSIS — U09.9 POST-ACUTE COVID-19 SYNDROME: Primary | ICD-10-CM

## 2021-08-17 PROCEDURE — 1036F TOBACCO NON-USER: CPT | Performed by: FAMILY MEDICINE

## 2021-08-17 PROCEDURE — 99213 OFFICE O/P EST LOW 20 MIN: CPT | Performed by: FAMILY MEDICINE

## 2021-08-17 PROCEDURE — 3008F BODY MASS INDEX DOCD: CPT | Performed by: FAMILY MEDICINE

## 2021-08-17 PROCEDURE — 3079F DIAST BP 80-89 MM HG: CPT | Performed by: FAMILY MEDICINE

## 2021-08-17 PROCEDURE — 3075F SYST BP GE 130 - 139MM HG: CPT | Performed by: FAMILY MEDICINE

## 2021-08-17 RX ORDER — DEXTROMETHORPHAN HYDROBROMIDE AND PROMETHAZINE HYDROCHLORIDE 15; 6.25 MG/5ML; MG/5ML
5 SOLUTION ORAL 4 TIMES DAILY PRN
Qty: 118 ML | Refills: 1 | Status: SHIPPED | OUTPATIENT
Start: 2021-08-17

## 2021-08-17 NOTE — PROGRESS NOTES
Assessment/Plan:    No problem-specific Assessment & Plan notes found for this encounter  Diagnoses and all orders for this visit:    Post-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  -     Promethazine-DM (PHENERGAN-DM) 6 25-15 mg/5 mL oral syrup; Take 5 mL by mouth 4 (four) times a day as needed for cough    Essential hypertension  Stable controlled    Follow up in 6 months or as needed        Subjective:      Patient ID: Bess Crane is a 61 y o  female  Patient is here for a follow up she tested Positive for Covid-19 on 07/18  She still has a lingering cough and sinus congestion  As well as lack of sense of smell or taste  No SOB  Also here to F/U for hypertension denies any symptoms related to this  Takes her meds daily  No side effects  The following portions of the patient's history were reviewed and updated as appropriate:   She  has a past medical history of Fibroids, High cholesterol, and Hypertension  She   Patient Active Problem List    Diagnosis Date Noted    Post-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 08/17/2021    Impaired fasting glucose 08/06/2019    Hypercholesteremia 06/14/2019    BMI 32 0-32 9,adult 02/06/2019    Encounter for gynecological examination without abnormal finding 12/03/2018    Essential hypertension 08/08/2018    Numerous moles 08/08/2018     She  has a past surgical history that includes Vein Surgery and Colonoscopy (2018)  Her family history includes Heart disease in her mother; Prostate cancer in her father  She  reports that she has never smoked  She has never used smokeless tobacco  She reports current alcohol use  She reports that she does not use drugs    Current Outpatient Medications   Medication Sig Dispense Refill    atorvastatin (LIPITOR) 20 mg tablet Take 1 tablet (20 mg total) by mouth daily 90 tablet 1    fluticasone (FLONASE) 50 mcg/act nasal spray SPRAY 1 SPRAY INTO EACH NOSTRIL EVERY DAY 16 mL 3    hydrochlorothiazide (HYDRODIURIL) 25 mg tablet Take 1 tablet (25 mg total) by mouth daily 30 tablet 3    losartan (COZAAR) 50 mg tablet TAKE 1 TABLET BY MOUTH EVERY DAY 30 tablet 1    Multiple Vitamins-Minerals (MULTIVITAMIN ADULT PO) Take by mouth      Omega-3 Fatty Acids (FISH OIL) 1200 MG CAPS Take by mouth      Promethazine-DM (PHENERGAN-DM) 6 25-15 mg/5 mL oral syrup Take 5 mL by mouth 4 (four) times a day as needed for cough 118 mL 1     No current facility-administered medications for this visit  Current Outpatient Medications on File Prior to Visit   Medication Sig    atorvastatin (LIPITOR) 20 mg tablet Take 1 tablet (20 mg total) by mouth daily    fluticasone (FLONASE) 50 mcg/act nasal spray SPRAY 1 SPRAY INTO EACH NOSTRIL EVERY DAY    hydrochlorothiazide (HYDRODIURIL) 25 mg tablet Take 1 tablet (25 mg total) by mouth daily    losartan (COZAAR) 50 mg tablet TAKE 1 TABLET BY MOUTH EVERY DAY    Multiple Vitamins-Minerals (MULTIVITAMIN ADULT PO) Take by mouth    Omega-3 Fatty Acids (FISH OIL) 1200 MG CAPS Take by mouth     No current facility-administered medications on file prior to visit  She is allergic to rosuvastatin and spironolactone       Review of Systems   Constitutional: Negative for activity change, appetite change, fatigue and fever  HENT: Positive for congestion  Negative for ear discharge  Respiratory: Positive for cough  Negative for shortness of breath  Cardiovascular: Negative for chest pain and palpitations  Gastrointestinal: Negative for diarrhea and nausea  Musculoskeletal: Negative for arthralgias and back pain  Skin: Negative for color change and rash  Neurological: Negative for dizziness and headaches  Psychiatric/Behavioral: Negative for agitation and behavioral problems           Objective:      /88   Pulse 78   Temp 97 8 °F (36 6 °C) (Temporal)   Ht 5' 5 5" (1 664 m)   Wt 86 2 kg (190 lb)   LMP  (LMP Unknown)   SpO2 95%   BMI 31 14 kg/m²          Physical Exam  Constitutional:       General: She is not in acute distress  Appearance: She is well-developed  She is not diaphoretic  HENT:      Head: Normocephalic and atraumatic  Nose: Nose normal    Eyes:      Conjunctiva/sclera: Conjunctivae normal       Pupils: Pupils are equal, round, and reactive to light  Cardiovascular:      Rate and Rhythm: Normal rate and regular rhythm  Heart sounds: Normal heart sounds  No murmur heard  Pulmonary:      Effort: Pulmonary effort is normal  No respiratory distress  Breath sounds: Normal breath sounds  No wheezing  Abdominal:      General: Bowel sounds are normal  There is no distension  Palpations: Abdomen is soft  Tenderness: There is no abdominal tenderness  Skin:     General: Skin is warm and dry  Findings: No erythema or rash  Neurological:      Mental Status: She is alert and oriented to person, place, and time

## 2021-09-01 DIAGNOSIS — I10 ESSENTIAL HYPERTENSION: ICD-10-CM

## 2021-09-01 RX ORDER — LOSARTAN POTASSIUM 50 MG/1
TABLET ORAL
Qty: 30 TABLET | Refills: 1 | Status: SHIPPED | OUTPATIENT
Start: 2021-09-01 | End: 2021-09-27

## 2021-09-09 DIAGNOSIS — I10 ESSENTIAL HYPERTENSION: ICD-10-CM

## 2021-09-09 RX ORDER — HYDROCHLOROTHIAZIDE 25 MG/1
TABLET ORAL
Qty: 30 TABLET | Refills: 3 | Status: SHIPPED | OUTPATIENT
Start: 2021-09-09 | End: 2022-01-10

## 2021-09-23 DIAGNOSIS — J30.9 ALLERGIC RHINITIS, UNSPECIFIED SEASONALITY, UNSPECIFIED TRIGGER: ICD-10-CM

## 2021-09-23 RX ORDER — FLUTICASONE PROPIONATE 50 MCG
SPRAY, SUSPENSION (ML) NASAL
Qty: 16 ML | Refills: 3 | Status: SHIPPED | OUTPATIENT
Start: 2021-09-23 | End: 2022-01-24

## 2021-09-26 DIAGNOSIS — I10 ESSENTIAL HYPERTENSION: ICD-10-CM

## 2021-09-27 RX ORDER — LOSARTAN POTASSIUM 50 MG/1
TABLET ORAL
Qty: 30 TABLET | Refills: 1 | Status: SHIPPED | OUTPATIENT
Start: 2021-09-27 | End: 2021-10-25

## 2021-10-06 DIAGNOSIS — E78.00 HYPERCHOLESTEREMIA: ICD-10-CM

## 2021-10-06 RX ORDER — ATORVASTATIN CALCIUM 20 MG/1
TABLET, FILM COATED ORAL
Qty: 90 TABLET | Refills: 1 | Status: SHIPPED | OUTPATIENT
Start: 2021-10-06 | End: 2022-05-31 | Stop reason: SDUPTHER

## 2021-10-23 DIAGNOSIS — I10 ESSENTIAL HYPERTENSION: ICD-10-CM

## 2021-10-25 RX ORDER — LOSARTAN POTASSIUM 50 MG/1
TABLET ORAL
Qty: 30 TABLET | Refills: 1 | Status: SHIPPED | OUTPATIENT
Start: 2021-10-25 | End: 2021-11-26

## 2021-10-26 ENCOUNTER — TELEPHONE (OUTPATIENT)
Dept: ADMINISTRATIVE | Facility: OTHER | Age: 66
End: 2021-10-26

## 2021-11-24 DIAGNOSIS — I10 ESSENTIAL HYPERTENSION: ICD-10-CM

## 2021-11-26 RX ORDER — LOSARTAN POTASSIUM 50 MG/1
TABLET ORAL
Qty: 30 TABLET | Refills: 1 | Status: SHIPPED | OUTPATIENT
Start: 2021-11-26 | End: 2021-12-23

## 2021-12-23 DIAGNOSIS — I10 ESSENTIAL HYPERTENSION: ICD-10-CM

## 2021-12-23 RX ORDER — LOSARTAN POTASSIUM 50 MG/1
TABLET ORAL
Qty: 30 TABLET | Refills: 1 | Status: SHIPPED | OUTPATIENT
Start: 2021-12-23 | End: 2022-01-24

## 2022-01-22 DIAGNOSIS — I10 ESSENTIAL HYPERTENSION: ICD-10-CM

## 2022-01-24 DIAGNOSIS — J30.9 ALLERGIC RHINITIS, UNSPECIFIED SEASONALITY, UNSPECIFIED TRIGGER: ICD-10-CM

## 2022-01-24 RX ORDER — LOSARTAN POTASSIUM 50 MG/1
TABLET ORAL
Qty: 30 TABLET | Refills: 1 | Status: SHIPPED | OUTPATIENT
Start: 2022-01-24 | End: 2022-02-17

## 2022-01-24 RX ORDER — FLUTICASONE PROPIONATE 50 MCG
SPRAY, SUSPENSION (ML) NASAL
Qty: 16 ML | Refills: 3 | Status: SHIPPED | OUTPATIENT
Start: 2022-01-24 | End: 2022-05-23

## 2022-02-17 DIAGNOSIS — I10 ESSENTIAL HYPERTENSION: ICD-10-CM

## 2022-02-17 RX ORDER — LOSARTAN POTASSIUM 50 MG/1
TABLET ORAL
Qty: 30 TABLET | Refills: 1 | Status: SHIPPED | OUTPATIENT
Start: 2022-02-17 | End: 2022-03-14

## 2022-03-13 DIAGNOSIS — I10 ESSENTIAL HYPERTENSION: ICD-10-CM

## 2022-03-14 RX ORDER — LOSARTAN POTASSIUM 50 MG/1
TABLET ORAL
Qty: 30 TABLET | Refills: 1 | Status: SHIPPED | OUTPATIENT
Start: 2022-03-14 | End: 2022-04-07

## 2022-03-15 ENCOUNTER — APPOINTMENT (OUTPATIENT)
Dept: LAB | Facility: CLINIC | Age: 67
End: 2022-03-15
Payer: COMMERCIAL

## 2022-03-15 ENCOUNTER — OFFICE VISIT (OUTPATIENT)
Dept: FAMILY MEDICINE CLINIC | Facility: CLINIC | Age: 67
End: 2022-03-15
Payer: COMMERCIAL

## 2022-03-15 VITALS
TEMPERATURE: 97.8 F | WEIGHT: 194.4 LBS | DIASTOLIC BLOOD PRESSURE: 84 MMHG | HEIGHT: 66 IN | OXYGEN SATURATION: 96 % | SYSTOLIC BLOOD PRESSURE: 132 MMHG | BODY MASS INDEX: 31.24 KG/M2 | HEART RATE: 66 BPM

## 2022-03-15 DIAGNOSIS — I10 ESSENTIAL HYPERTENSION: Primary | ICD-10-CM

## 2022-03-15 DIAGNOSIS — Z23 NEED FOR SHINGLES VACCINE: ICD-10-CM

## 2022-03-15 DIAGNOSIS — R73.01 IMPAIRED FASTING GLUCOSE: ICD-10-CM

## 2022-03-15 DIAGNOSIS — E78.00 HYPERCHOLESTEREMIA: ICD-10-CM

## 2022-03-15 LAB
ALBUMIN SERPL BCP-MCNC: 3.9 G/DL (ref 3.5–5)
ALP SERPL-CCNC: 67 U/L (ref 46–116)
ALT SERPL W P-5'-P-CCNC: 30 U/L (ref 12–78)
ANION GAP SERPL CALCULATED.3IONS-SCNC: 7 MMOL/L (ref 4–13)
AST SERPL W P-5'-P-CCNC: 22 U/L (ref 5–45)
BILIRUB SERPL-MCNC: 0.58 MG/DL (ref 0.2–1)
BUN SERPL-MCNC: 9 MG/DL (ref 5–25)
CALCIUM SERPL-MCNC: 9.8 MG/DL (ref 8.3–10.1)
CHLORIDE SERPL-SCNC: 96 MMOL/L (ref 100–108)
CHOLEST SERPL-MCNC: 189 MG/DL
CO2 SERPL-SCNC: 31 MMOL/L (ref 21–32)
CREAT SERPL-MCNC: 0.68 MG/DL (ref 0.6–1.3)
EST. AVERAGE GLUCOSE BLD GHB EST-MCNC: 117 MG/DL
GFR SERPL CREATININE-BSD FRML MDRD: 92 ML/MIN/1.73SQ M
GLUCOSE P FAST SERPL-MCNC: 111 MG/DL (ref 65–99)
HBA1C MFR BLD: 5.7 %
HDLC SERPL-MCNC: 74 MG/DL
LDLC SERPL CALC-MCNC: 100 MG/DL (ref 0–100)
NONHDLC SERPL-MCNC: 115 MG/DL
POTASSIUM SERPL-SCNC: 3.5 MMOL/L (ref 3.5–5.3)
PROT SERPL-MCNC: 7.3 G/DL (ref 6.4–8.2)
SODIUM SERPL-SCNC: 134 MMOL/L (ref 136–145)
TRIGL SERPL-MCNC: 74 MG/DL

## 2022-03-15 PROCEDURE — 3075F SYST BP GE 130 - 139MM HG: CPT | Performed by: FAMILY MEDICINE

## 2022-03-15 PROCEDURE — 99213 OFFICE O/P EST LOW 20 MIN: CPT | Performed by: FAMILY MEDICINE

## 2022-03-15 PROCEDURE — 3079F DIAST BP 80-89 MM HG: CPT | Performed by: FAMILY MEDICINE

## 2022-03-15 PROCEDURE — 83036 HEMOGLOBIN GLYCOSYLATED A1C: CPT

## 2022-03-15 PROCEDURE — 1036F TOBACCO NON-USER: CPT | Performed by: FAMILY MEDICINE

## 2022-03-15 PROCEDURE — 3008F BODY MASS INDEX DOCD: CPT | Performed by: FAMILY MEDICINE

## 2022-03-15 PROCEDURE — 80061 LIPID PANEL: CPT

## 2022-03-15 PROCEDURE — 36415 COLL VENOUS BLD VENIPUNCTURE: CPT

## 2022-03-15 PROCEDURE — 3725F SCREEN DEPRESSION PERFORMED: CPT | Performed by: FAMILY MEDICINE

## 2022-03-15 PROCEDURE — 80053 COMPREHEN METABOLIC PANEL: CPT

## 2022-03-15 NOTE — PROGRESS NOTES
Assessment/Plan:    No problem-specific Assessment & Plan notes found for this encounter  Diagnoses and all orders for this visit:    Essential hypertension  Stable controlled    Hypercholesteremia  -     Lipid panel; Future    Impaired fasting glucose  -     Comprehensive metabolic panel; Future  -     Hemoglobin A1C; Future    Need for shingles vaccine  -     Zoster Vac Recomb Adjuvanted 50 MCG/0 5ML SUSR; Inject 1 Dose into a muscle once for 1 dose      Follow up in 6 months to 1 year    Subjective:      Patient ID: Luis Ferreira is a 61 y o  female  Patient is here to follow up for hypertension  She denies any symptoms related to this  Also has impaired fasting glucose denies any symptoms  The following portions of the patient's history were reviewed and updated as appropriate:   She  has a past medical history of Fibroids, High cholesterol, and Hypertension  She   Patient Active Problem List    Diagnosis Date Noted    Post-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 08/17/2021    Impaired fasting glucose 08/06/2019    Hypercholesteremia 06/14/2019    BMI 32 0-32 9,adult 02/06/2019    Encounter for gynecological examination without abnormal finding 12/03/2018    Essential hypertension 08/08/2018    Numerous moles 08/08/2018     She  has a past surgical history that includes Vein Surgery and Colonoscopy (2018)  Her family history includes Heart disease in her mother; Prostate cancer in her father  She  reports that she has never smoked  She has never used smokeless tobacco  She reports current alcohol use  She reports that she does not use drugs    Current Outpatient Medications   Medication Sig Dispense Refill    atorvastatin (LIPITOR) 20 mg tablet TAKE 1 TABLET BY MOUTH EVERY DAY 90 tablet 1    fluticasone (FLONASE) 50 mcg/act nasal spray SPRAY 1 SPRAY INTO EACH NOSTRIL EVERY DAY 16 mL 3    hydrochlorothiazide (HYDRODIURIL) 25 mg tablet TAKE 1 TABLET BY MOUTH EVERY DAY 90 tablet 3    losartan (COZAAR) 50 mg tablet TAKE 1 TABLET BY MOUTH EVERY DAY 30 tablet 1    Multiple Vitamins-Minerals (MULTIVITAMIN ADULT PO) Take by mouth      Omega-3 Fatty Acids (FISH OIL) 1200 MG CAPS Take by mouth      Promethazine-DM (PHENERGAN-DM) 6 25-15 mg/5 mL oral syrup Take 5 mL by mouth 4 (four) times a day as needed for cough (Patient not taking: Reported on 3/15/2022 ) 118 mL 1    Zoster Vac Recomb Adjuvanted 50 MCG/0 5ML SUSR Inject 1 Dose into a muscle once for 1 dose 1 each 0     No current facility-administered medications for this visit  Current Outpatient Medications on File Prior to Visit   Medication Sig    atorvastatin (LIPITOR) 20 mg tablet TAKE 1 TABLET BY MOUTH EVERY DAY    fluticasone (FLONASE) 50 mcg/act nasal spray SPRAY 1 SPRAY INTO EACH NOSTRIL EVERY DAY    hydrochlorothiazide (HYDRODIURIL) 25 mg tablet TAKE 1 TABLET BY MOUTH EVERY DAY    losartan (COZAAR) 50 mg tablet TAKE 1 TABLET BY MOUTH EVERY DAY    Multiple Vitamins-Minerals (MULTIVITAMIN ADULT PO) Take by mouth    Omega-3 Fatty Acids (FISH OIL) 1200 MG CAPS Take by mouth    Promethazine-DM (PHENERGAN-DM) 6 25-15 mg/5 mL oral syrup Take 5 mL by mouth 4 (four) times a day as needed for cough (Patient not taking: Reported on 3/15/2022 )     No current facility-administered medications on file prior to visit  She is allergic to rosuvastatin and spironolactone       Review of Systems   Constitutional: Negative for activity change, appetite change, fatigue and fever  HENT: Negative for congestion and ear discharge  Respiratory: Negative for cough and shortness of breath  Cardiovascular: Negative for chest pain and palpitations  Gastrointestinal: Negative for diarrhea and nausea  Musculoskeletal: Negative for arthralgias and back pain  Skin: Negative for color change and rash  Neurological: Negative for dizziness and headaches  Psychiatric/Behavioral: Negative for agitation and behavioral problems  Objective:      /84 (BP Location: Right arm, Patient Position: Sitting)   Pulse 66   Temp 97 8 °F (36 6 °C) (Temporal)   Ht 5' 5 5" (1 664 m)   Wt 88 2 kg (194 lb 6 4 oz)   LMP  (LMP Unknown)   SpO2 96%   BMI 31 86 kg/m²          Physical Exam  Constitutional:       General: She is not in acute distress  Appearance: She is well-developed  She is not diaphoretic  HENT:      Head: Normocephalic and atraumatic  Nose: Nose normal    Eyes:      Conjunctiva/sclera: Conjunctivae normal       Pupils: Pupils are equal, round, and reactive to light  Cardiovascular:      Rate and Rhythm: Normal rate and regular rhythm  Heart sounds: Normal heart sounds  No murmur heard  Pulmonary:      Effort: Pulmonary effort is normal  No respiratory distress  Breath sounds: Normal breath sounds  No wheezing  Abdominal:      General: Bowel sounds are normal  There is no distension  Palpations: Abdomen is soft  Tenderness: There is no abdominal tenderness  Skin:     General: Skin is warm and dry  Findings: No erythema or rash  Neurological:      Mental Status: She is alert and oriented to person, place, and time

## 2022-04-07 DIAGNOSIS — I10 ESSENTIAL HYPERTENSION: ICD-10-CM

## 2022-04-07 RX ORDER — LOSARTAN POTASSIUM 50 MG/1
TABLET ORAL
Qty: 30 TABLET | Refills: 1 | Status: SHIPPED | OUTPATIENT
Start: 2022-04-07 | End: 2022-05-06

## 2022-05-06 DIAGNOSIS — I10 ESSENTIAL HYPERTENSION: ICD-10-CM

## 2022-05-06 RX ORDER — LOSARTAN POTASSIUM 50 MG/1
TABLET ORAL
Qty: 30 TABLET | Refills: 1 | Status: SHIPPED | OUTPATIENT
Start: 2022-05-06 | End: 2022-06-02

## 2022-05-23 DIAGNOSIS — J30.9 ALLERGIC RHINITIS, UNSPECIFIED SEASONALITY, UNSPECIFIED TRIGGER: ICD-10-CM

## 2022-05-23 RX ORDER — FLUTICASONE PROPIONATE 50 MCG
SPRAY, SUSPENSION (ML) NASAL
Qty: 16 ML | Refills: 3 | Status: SHIPPED | OUTPATIENT
Start: 2022-05-23

## 2022-05-31 DIAGNOSIS — E78.00 HYPERCHOLESTEREMIA: ICD-10-CM

## 2022-05-31 RX ORDER — ATORVASTATIN CALCIUM 20 MG/1
20 TABLET, FILM COATED ORAL DAILY
Qty: 90 TABLET | Refills: 1 | Status: SHIPPED | OUTPATIENT
Start: 2022-05-31

## 2022-06-02 DIAGNOSIS — I10 ESSENTIAL HYPERTENSION: ICD-10-CM

## 2022-06-02 RX ORDER — LOSARTAN POTASSIUM 50 MG/1
TABLET ORAL
Qty: 90 TABLET | Refills: 1 | Status: SHIPPED | OUTPATIENT
Start: 2022-06-02

## 2022-08-01 ENCOUNTER — CLINICAL SUPPORT (OUTPATIENT)
Dept: FAMILY MEDICINE CLINIC | Facility: CLINIC | Age: 67
End: 2022-08-01
Payer: COMMERCIAL

## 2022-08-01 DIAGNOSIS — Z23 NEED FOR PNEUMOCOCCAL VACCINE: Primary | ICD-10-CM

## 2022-08-01 PROCEDURE — 90471 IMMUNIZATION ADMIN: CPT | Performed by: FAMILY MEDICINE

## 2022-08-01 PROCEDURE — 90677 PCV20 VACCINE IM: CPT | Performed by: FAMILY MEDICINE

## 2022-09-13 ENCOUNTER — TELEPHONE (OUTPATIENT)
Dept: FAMILY MEDICINE CLINIC | Facility: CLINIC | Age: 67
End: 2022-09-13

## 2022-09-13 DIAGNOSIS — R05.9 COUGH: Primary | ICD-10-CM

## 2022-09-13 RX ORDER — DEXTROMETHORPHAN HYDROBROMIDE AND PROMETHAZINE HYDROCHLORIDE 15; 6.25 MG/5ML; MG/5ML
5 SOLUTION ORAL 4 TIMES DAILY PRN
Qty: 118 ML | Refills: 1 | Status: SHIPPED | OUTPATIENT
Start: 2022-09-13

## 2022-09-13 RX ORDER — LORATADINE 10 MG/1
10 TABLET ORAL DAILY
Qty: 10 TABLET | Refills: 1 | Status: SHIPPED | OUTPATIENT
Start: 2022-09-13

## 2022-09-13 NOTE — TELEPHONE ENCOUNTER
A lot of coughing, facial & gum pain    using otc med's, nothing helping   she tested negative with home test  is asking if  would prescribe med for her ??

## 2022-09-13 NOTE — TELEPHONE ENCOUNTER
Cough medication and allergy medication sent  If symptoms continue needs an appt here or care now if no appts

## 2022-10-20 DIAGNOSIS — J30.9 ALLERGIC RHINITIS, UNSPECIFIED SEASONALITY, UNSPECIFIED TRIGGER: ICD-10-CM

## 2022-10-20 RX ORDER — FLUTICASONE PROPIONATE 50 MCG
SPRAY, SUSPENSION (ML) NASAL
Qty: 16 ML | Refills: 3 | Status: SHIPPED | OUTPATIENT
Start: 2022-10-20

## 2022-10-26 ENCOUNTER — TELEPHONE (OUTPATIENT)
Dept: ADMINISTRATIVE | Facility: OTHER | Age: 67
End: 2022-10-26

## 2022-10-26 DIAGNOSIS — Z12.39 SCREENING FOR BREAST CANCER: ICD-10-CM

## 2022-10-26 NOTE — TELEPHONE ENCOUNTER
----- Message from Mario Peck MA sent at 10/26/2022  8:17 AM EDT -----  Regarding: Care Gap Request  10/26/22 8:17 AM    Donald, our patient Kiara Alexandra has had Mammogram completed/performed  Please assist in updating the patient chart by pulling the Care Everywhere (CE) document  The date of service is 10/24/2022       Thank you,  Shanda RIVERA

## 2022-10-27 NOTE — TELEPHONE ENCOUNTER
Upon review of the In Basket request we HM is updated with mammogram    Any additional questions or concerns should be emailed to the Practice Liaisons via the appropriate education email address, please do not reply via In Basket      Thank you  Julianne Olson

## 2022-11-01 DIAGNOSIS — I10 ESSENTIAL HYPERTENSION: ICD-10-CM

## 2022-11-01 RX ORDER — HYDROCHLOROTHIAZIDE 25 MG/1
25 TABLET ORAL DAILY
Qty: 90 TABLET | Refills: 3 | Status: SHIPPED | OUTPATIENT
Start: 2022-11-01

## 2022-11-03 ENCOUNTER — TELEPHONE (OUTPATIENT)
Dept: FAMILY MEDICINE CLINIC | Facility: CLINIC | Age: 67
End: 2022-11-03

## 2022-11-03 DIAGNOSIS — R73.01 IMPAIRED FASTING GLUCOSE: Primary | ICD-10-CM

## 2022-11-03 DIAGNOSIS — E78.00 HYPERCHOLESTEREMIA: ICD-10-CM

## 2022-11-13 DIAGNOSIS — J30.9 ALLERGIC RHINITIS, UNSPECIFIED SEASONALITY, UNSPECIFIED TRIGGER: ICD-10-CM

## 2022-11-14 RX ORDER — FLUTICASONE PROPIONATE 50 MCG
SPRAY, SUSPENSION (ML) NASAL
Qty: 48 ML | Refills: 2 | Status: SHIPPED | OUTPATIENT
Start: 2022-11-14

## 2022-11-27 DIAGNOSIS — E78.00 HYPERCHOLESTEREMIA: ICD-10-CM

## 2022-11-28 RX ORDER — ATORVASTATIN CALCIUM 20 MG/1
TABLET, FILM COATED ORAL
Qty: 30 TABLET | Refills: 5 | Status: SHIPPED | OUTPATIENT
Start: 2022-11-28

## 2022-12-13 DIAGNOSIS — I10 ESSENTIAL HYPERTENSION: ICD-10-CM

## 2022-12-13 RX ORDER — LOSARTAN POTASSIUM 50 MG/1
TABLET ORAL
Qty: 30 TABLET | Refills: 5 | Status: SHIPPED | OUTPATIENT
Start: 2022-12-13

## 2022-12-27 DIAGNOSIS — E78.00 HYPERCHOLESTEREMIA: ICD-10-CM

## 2022-12-27 RX ORDER — ATORVASTATIN CALCIUM 20 MG/1
TABLET, FILM COATED ORAL
Qty: 90 TABLET | Refills: 2 | Status: SHIPPED | OUTPATIENT
Start: 2022-12-27

## 2023-01-07 DIAGNOSIS — I10 ESSENTIAL HYPERTENSION: ICD-10-CM

## 2023-01-09 RX ORDER — LOSARTAN POTASSIUM 50 MG/1
TABLET ORAL
Qty: 90 TABLET | Refills: 2 | Status: SHIPPED | OUTPATIENT
Start: 2023-01-09

## 2023-03-13 ENCOUNTER — RA CDI HCC (OUTPATIENT)
Dept: OTHER | Facility: HOSPITAL | Age: 68
End: 2023-03-13

## 2023-03-20 ENCOUNTER — APPOINTMENT (OUTPATIENT)
Dept: RADIOLOGY | Facility: CLINIC | Age: 68
End: 2023-03-20

## 2023-03-20 ENCOUNTER — OFFICE VISIT (OUTPATIENT)
Dept: FAMILY MEDICINE CLINIC | Facility: CLINIC | Age: 68
End: 2023-03-20

## 2023-03-20 VITALS
HEART RATE: 68 BPM | DIASTOLIC BLOOD PRESSURE: 78 MMHG | SYSTOLIC BLOOD PRESSURE: 120 MMHG | WEIGHT: 182 LBS | OXYGEN SATURATION: 100 % | BODY MASS INDEX: 29.83 KG/M2

## 2023-03-20 DIAGNOSIS — M25.511 ACUTE PAIN OF RIGHT SHOULDER: Primary | ICD-10-CM

## 2023-03-20 DIAGNOSIS — M25.511 ACUTE PAIN OF RIGHT SHOULDER: ICD-10-CM

## 2023-03-20 NOTE — PROGRESS NOTES
Name: Fadumo Gifford      : 1955      MRN: [id number]  Encounter Provider: Jose Angel Doan MD  Encounter Date: 3/20/2023   Encounter department: [address] [place] [id number]     1  Acute pain of right shoulder  -     XR shoulder 2+ vw right; Future; Expected date: 2023  -     Ambulatory Referral to Physical Therapy; Future  Recommend ibuprofen as needed    Follow up as needed         Subjective     Patient is here because she recently twisted her right arm awkardley and has been feeling pain and discomfort since  She has tenderness when raising her arm above coming her hair  Review of Systems   Musculoskeletal: Positive for arthralgias and myalgias         Past Medical History:   Diagnosis Date   • Fibroids    • High cholesterol    • Hypertension      Past Surgical History:   Procedure Laterality Date   • COLONOSCOPY     • VEIN SURGERY       Family History   Problem Relation Age of Onset   • Heart disease Mother    • Prostate cancer Father    • Breast cancer Neg Hx    • Colon cancer Neg Hx    • Ovarian cancer Neg Hx      Social History     Socioeconomic History   • Marital status: /Civil Union     Spouse name: Not on file   • Number of children: Not on file   • Years of education: Not on file   • Highest education level: Not on file   Occupational History   • Not on file   Tobacco Use   • Smoking status: Never   • Smokeless tobacco: Never   Substance and Sexual Activity   • Alcohol use: Yes     Comment: socially    • Drug use: No   • Sexual activity: Never     Partners: Male     Birth control/protection: None   Other Topics Concern   • Not on file   Social History Narrative   • Not on file     Social Determinants of Health     Financial Resource Strain: Not on file   Food Insecurity: Not on file   Transportation Needs: Not on file   Physical Activity: Not on file   Stress: Not on file   Social Connections: Not on file   Intimate Partner Violence: Not on file Housing Stability: Not on file     Current Outpatient Medications on File Prior to Visit   Medication Sig   • atorvastatin (LIPITOR) 20 mg tablet TAKE 1 TABLET BY MOUTH EVERY DAY   • fluticasone (FLONASE) 50 mcg/act nasal spray SPRAY 1 SPRAY INTO EACH NOSTRIL EVERY DAY   • hydrochlorothiazide (HYDRODIURIL) 25 mg tablet Take 1 tablet (25 mg total) by mouth daily   • losartan (COZAAR) 50 mg tablet TAKE 1 TABLET BY MOUTH EVERY DAY   • Multiple Vitamins-Minerals (MULTIVITAMIN ADULT PO) Take by mouth   • Omega-3 Fatty Acids (FISH OIL) 1200 MG CAPS Take by mouth   • Promethazine-DM (PHENERGAN-DM) 6 25-15 mg/5 mL oral syrup Take 5 mL by mouth 4 (four) times a day as needed for cough (Patient not taking: Reported on 3/20/2023)   • [DISCONTINUED] loratadine (CLARITIN) 10 mg tablet Take 1 tablet (10 mg total) by mouth daily (Patient not taking: Reported on 3/20/2023)     Allergies   Allergen Reactions   • Rosuvastatin    • Spironolactone      Immunization History   Administered Date(s) Administered   • INFLUENZA 11/15/2021   • Influenza, injectable, quadrivalent, preservative free 0 5 mL 02/06/2019   • Pneumococcal Conjugate Vaccine 20-valent (Pcv20), Polysace 08/01/2022       Objective     /78   Pulse 68   Wt 82 6 kg (182 lb)   LMP  (LMP Unknown)   SpO2 100%   BMI 29 83 kg/m²     Physical Exam  Constitutional:       General: She is not in acute distress  Appearance: She is well-developed  She is not diaphoretic  HENT:      Head: Normocephalic and atraumatic  Nose: Nose normal    Eyes:      Conjunctiva/sclera: Conjunctivae normal       Pupils: Pupils are equal, round, and reactive to light  Cardiovascular:      Rate and Rhythm: Normal rate and regular rhythm  Heart sounds: Normal heart sounds  No murmur heard  Pulmonary:      Effort: Pulmonary effort is normal  No respiratory distress  Breath sounds: Normal breath sounds  No wheezing     Abdominal:      General: Bowel sounds are normal  There is no distension  Palpations: Abdomen is soft  Tenderness: There is no abdominal tenderness  Musculoskeletal:         General: Tenderness present  Comments: Tenderness anterior shoulder decreased ROM on elevation  Above her head  Skin:     General: Skin is warm and dry  Findings: No erythema or rash  Neurological:      Mental Status: She is alert and oriented to person, place, and time         Tiffany Perez MD

## 2023-11-23 DIAGNOSIS — I10 ESSENTIAL HYPERTENSION: ICD-10-CM

## 2023-11-24 RX ORDER — HYDROCHLOROTHIAZIDE 25 MG/1
25 TABLET ORAL DAILY
Qty: 90 TABLET | Refills: 3 | Status: SHIPPED | OUTPATIENT
Start: 2023-11-24

## 2023-12-13 DIAGNOSIS — I10 ESSENTIAL HYPERTENSION: ICD-10-CM

## 2023-12-13 DIAGNOSIS — E78.00 HYPERCHOLESTEREMIA: ICD-10-CM

## 2023-12-13 RX ORDER — ATORVASTATIN CALCIUM 20 MG/1
20 TABLET, FILM COATED ORAL DAILY
Qty: 90 TABLET | Refills: 2 | Status: SHIPPED | OUTPATIENT
Start: 2023-12-13

## 2023-12-13 RX ORDER — LOSARTAN POTASSIUM 50 MG/1
50 TABLET ORAL DAILY
Qty: 90 TABLET | Refills: 2 | Status: SHIPPED | OUTPATIENT
Start: 2023-12-13

## 2023-12-13 RX ORDER — HYDROCHLOROTHIAZIDE 25 MG/1
25 TABLET ORAL DAILY
Qty: 90 TABLET | Refills: 3 | Status: SHIPPED | OUTPATIENT
Start: 2023-12-13

## 2024-02-21 PROBLEM — Z01.419 ENCOUNTER FOR GYNECOLOGICAL EXAMINATION WITHOUT ABNORMAL FINDING: Status: RESOLVED | Noted: 2018-12-03 | Resolved: 2024-02-21

## 2024-06-03 ENCOUNTER — APPOINTMENT (OUTPATIENT)
Dept: LAB | Facility: CLINIC | Age: 69
End: 2024-06-03
Payer: MEDICARE

## 2024-06-03 ENCOUNTER — OFFICE VISIT (OUTPATIENT)
Dept: FAMILY MEDICINE CLINIC | Facility: CLINIC | Age: 69
End: 2024-06-03
Payer: MEDICARE

## 2024-06-03 VITALS
OXYGEN SATURATION: 99 % | DIASTOLIC BLOOD PRESSURE: 82 MMHG | HEIGHT: 67 IN | SYSTOLIC BLOOD PRESSURE: 130 MMHG | TEMPERATURE: 97.4 F | HEART RATE: 78 BPM | WEIGHT: 183 LBS | BODY MASS INDEX: 28.72 KG/M2

## 2024-06-03 DIAGNOSIS — Z13.1 SCREENING FOR DIABETES MELLITUS: ICD-10-CM

## 2024-06-03 DIAGNOSIS — E28.39 MENOPAUSE OVARIAN FAILURE: ICD-10-CM

## 2024-06-03 DIAGNOSIS — R73.01 IMPAIRED FASTING GLUCOSE: ICD-10-CM

## 2024-06-03 DIAGNOSIS — E78.00 HYPERCHOLESTEREMIA: ICD-10-CM

## 2024-06-03 DIAGNOSIS — J30.9 ALLERGIC RHINITIS, UNSPECIFIED SEASONALITY, UNSPECIFIED TRIGGER: ICD-10-CM

## 2024-06-03 DIAGNOSIS — Z00.00 MEDICARE ANNUAL WELLNESS VISIT, SUBSEQUENT: Primary | ICD-10-CM

## 2024-06-03 PROBLEM — U09.9 POST-ACUTE COVID-19 SYNDROME: Status: RESOLVED | Noted: 2021-08-17 | Resolved: 2024-06-03

## 2024-06-03 LAB
ALBUMIN SERPL BCP-MCNC: 4.3 G/DL (ref 3.5–5)
ALP SERPL-CCNC: 68 U/L (ref 34–104)
ALT SERPL W P-5'-P-CCNC: 25 U/L (ref 7–52)
ANION GAP SERPL CALCULATED.3IONS-SCNC: 11 MMOL/L (ref 4–13)
AST SERPL W P-5'-P-CCNC: 24 U/L (ref 13–39)
BILIRUB SERPL-MCNC: 0.66 MG/DL (ref 0.2–1)
BUN SERPL-MCNC: 11 MG/DL (ref 5–25)
CALCIUM SERPL-MCNC: 9.7 MG/DL (ref 8.4–10.2)
CHLORIDE SERPL-SCNC: 96 MMOL/L (ref 96–108)
CHOLEST SERPL-MCNC: 230 MG/DL
CO2 SERPL-SCNC: 31 MMOL/L (ref 21–32)
CREAT SERPL-MCNC: 0.49 MG/DL (ref 0.6–1.3)
EST. AVERAGE GLUCOSE BLD GHB EST-MCNC: 126 MG/DL
GFR SERPL CREATININE-BSD FRML MDRD: 101 ML/MIN/1.73SQ M
GLUCOSE P FAST SERPL-MCNC: 110 MG/DL (ref 65–99)
HBA1C MFR BLD: 6 %
HDLC SERPL-MCNC: 73 MG/DL
LDLC SERPL CALC-MCNC: 131 MG/DL (ref 0–100)
NONHDLC SERPL-MCNC: 157 MG/DL
POTASSIUM SERPL-SCNC: 3.9 MMOL/L (ref 3.5–5.3)
PROT SERPL-MCNC: 6.8 G/DL (ref 6.4–8.4)
SODIUM SERPL-SCNC: 138 MMOL/L (ref 135–147)
TRIGL SERPL-MCNC: 132 MG/DL

## 2024-06-03 PROCEDURE — 36415 COLL VENOUS BLD VENIPUNCTURE: CPT

## 2024-06-03 PROCEDURE — 80053 COMPREHEN METABOLIC PANEL: CPT

## 2024-06-03 PROCEDURE — G0439 PPPS, SUBSEQ VISIT: HCPCS | Performed by: FAMILY MEDICINE

## 2024-06-03 PROCEDURE — 83036 HEMOGLOBIN GLYCOSYLATED A1C: CPT

## 2024-06-03 PROCEDURE — 80061 LIPID PANEL: CPT

## 2024-06-03 RX ORDER — FLUTICASONE PROPIONATE 50 MCG
1 SPRAY, SUSPENSION (ML) NASAL DAILY
Qty: 48 ML | Refills: 2 | Status: SHIPPED | OUTPATIENT
Start: 2024-06-03

## 2024-06-03 RX ORDER — PHENOL 1.4 %
600 AEROSOL, SPRAY (ML) MUCOUS MEMBRANE 2 TIMES DAILY WITH MEALS
COMMUNITY

## 2024-06-03 NOTE — PATIENT INSTRUCTIONS
Medicare Preventive Visit Patient Instructions  Thank you for completing your Welcome to Medicare Visit or Medicare Annual Wellness Visit today. Your next wellness visit will be due in one year (6/4/2025).  The screening/preventive services that you may require over the next 5-10 years are detailed below. Some tests may not apply to you based off risk factors and/or age. Screening tests ordered at today's visit but not completed yet may show as past due. Also, please note that scanned in results may not display below.  Preventive Screenings:  Service Recommendations Previous Testing/Comments   Colorectal Cancer Screening  * Colonoscopy    * Fecal Occult Blood Test (FOBT)/Fecal Immunochemical Test (FIT)  * Fecal DNA/Cologuard Test  * Flexible Sigmoidoscopy Age: 45-75 years old   Colonoscopy: every 10 years (may be performed more frequently if at higher risk)  OR  FOBT/FIT: every 1 year  OR  Cologuard: every 3 years  OR  Sigmoidoscopy: every 5 years  Screening may be recommended earlier than age 45 if at higher risk for colorectal cancer. Also, an individualized decision between you and your healthcare provider will decide whether screening between the ages of 76-85 would be appropriate. Colonoscopy: 12/04/2017  FOBT/FIT: Not on file  Cologuard: Not on file  Sigmoidoscopy: Not on file    Screening Current     Breast Cancer Screening Age: 40+ years old  Frequency: every 1-2 years  Not required if history of left and right mastectomy Mammogram: 11/27/2023    Screening Current   Cervical Cancer Screening Between the ages of 21-29, pap smear recommended once every 3 years.   Between the ages of 30-65, can perform pap smear with HPV co-testing every 5 years.   Recommendations may differ for women with a history of total hysterectomy, cervical cancer, or abnormal pap smears in past. Pap Smear: 12/03/2018    Screening Not Indicated   Hepatitis C Screening Once for adults born between 1945 and 1965  More frequently in  patients at high risk for Hepatitis C Hep C Antibody: 02/06/2019    Screening Current   Diabetes Screening 1-2 times per year if you're at risk for diabetes or have pre-diabetes Fasting glucose: 111 mg/dL (3/15/2022)  A1C: 5.7 % (3/15/2022)      Cholesterol Screening Once every 5 years if you don't have a lipid disorder. May order more often based on risk factors. Lipid panel: 06/03/2024    Screening Not Indicated  History Lipid Disorder     Other Preventive Screenings Covered by Medicare:  Abdominal Aortic Aneurysm (AAA) Screening: covered once if your at risk. You're considered to be at risk if you have a family history of AAA.  Lung Cancer Screening: covers low dose CT scan once per year if you meet all of the following conditions: (1) Age 55-77; (2) No signs or symptoms of lung cancer; (3) Current smoker or have quit smoking within the last 15 years; (4) You have a tobacco smoking history of at least 20 pack years (packs per day multiplied by number of years you smoked); (5) You get a written order from a healthcare provider.  Glaucoma Screening: covered annually if you're considered high risk: (1) You have diabetes OR (2) Family history of glaucoma OR (3)  aged 50 and older OR (4)  American aged 65 and older  Osteoporosis Screening: covered every 2 years if you meet one of the following conditions: (1) You're estrogen deficient and at risk for osteoporosis based off medical history and other findings; (2) Have a vertebral abnormality; (3) On glucocorticoid therapy for more than 3 months; (4) Have primary hyperparathyroidism; (5) On osteoporosis medications and need to assess response to drug therapy.   Last bone density test (DXA Scan): Not on file.  HIV Screening: covered annually if you're between the age of 15-65. Also covered annually if you are younger than 15 and older than 65 with risk factors for HIV infection. For pregnant patients, it is covered up to 3 times per  pregnancy.    Immunizations:  Immunization Recommendations   Influenza Vaccine Annual influenza vaccination during flu season is recommended for all persons aged >= 6 months who do not have contraindications   Pneumococcal Vaccine   * Pneumococcal conjugate vaccine = PCV13 (Prevnar 13), PCV15 (Vaxneuvance), PCV20 (Prevnar 20)  * Pneumococcal polysaccharide vaccine = PPSV23 (Pneumovax) Adults 19-63 yo with certain risk factors or if 65+ yo  If never received any pneumonia vaccine: recommend Prevnar 20 (PCV20)  Give PCV20 if previously received 1 dose of PCV13 or PPSV23   Hepatitis B Vaccine 3 dose series if at intermediate or high risk (ex: diabetes, end stage renal disease, liver disease)   Respiratory syncytial virus (RSV) Vaccine - COVERED BY MEDICARE PART D  * RSVPreF3 (Arexvy) CDC recommends that adults 60 years of age and older may receive a single dose of RSV vaccine using shared clinical decision-making (SCDM)   Tetanus (Td) Vaccine - COST NOT COVERED BY MEDICARE PART B Following completion of primary series, a booster dose should be given every 10 years to maintain immunity against tetanus. Td may also be given as tetanus wound prophylaxis.   Tdap Vaccine - COST NOT COVERED BY MEDICARE PART B Recommended at least once for all adults. For pregnant patients, recommended with each pregnancy.   Shingles Vaccine (Shingrix) - COST NOT COVERED BY MEDICARE PART B  2 shot series recommended in those 19 years and older who have or will have weakened immune systems or those 50 years and older     Health Maintenance Due:      Topic Date Due   • Cervical Cancer Screening  12/03/2021   • Breast Cancer Screening: Mammogram  11/27/2024   • Colorectal Cancer Screening  12/04/2027   • Hepatitis C Screening  Completed     Immunizations Due:      Topic Date Due   • COVID-19 Vaccine (1 - 2023-24 season) Never done   • Influenza Vaccine (Season Ended) 09/01/2024     Advance Directives   What are advance directives?  Advance  directives are legal documents that state your wishes and plans for medical care. These plans are made ahead of time in case you lose your ability to make decisions for yourself. Advance directives can apply to any medical decision, such as the treatments you want, and if you want to donate organs.   What are the types of advance directives?  There are many types of advance directives, and each state has rules about how to use them. You may choose a combination of any of the following:  Living will:  This is a written record of the treatment you want. You can also choose which treatments you do not want, which to limit, and which to stop at a certain time. This includes surgery, medicine, IV fluid, and tube feedings.   Durable power of  for healthcare (DPAHC):  This is a written record that states who you want to make healthcare choices for you when you are unable to make them for yourself. This person, called a proxy, is usually a family member or a friend. You may choose more than 1 proxy.  Do not resuscitate (DNR) order:  A DNR order is used in case your heart stops beating or you stop breathing. It is a request not to have certain forms of treatment, such as CPR. A DNR order may be included in other types of advance directives.  Medical directive:  This covers the care that you want if you are in a coma, near death, or unable to make decisions for yourself. You can list the treatments you want for each condition. Treatment may include pain medicine, surgery, blood transfusions, dialysis, IV or tube feedings, and a ventilator (breathing machine).  Values history:  This document has questions about your views, beliefs, and how you feel and think about life. This information can help others choose the care that you would choose.  Why are advance directives important?  An advance directive helps you control your care. Although spoken wishes may be used, it is better to have your wishes written down. Spoken  wishes can be misunderstood, or not followed. Treatments may be given even if you do not want them. An advance directive may make it easier for your family to make difficult choices about your care.   Weight Management   Why it is important to manage your weight:  Being overweight increases your risk of health conditions such as heart disease, high blood pressure, type 2 diabetes, and certain types of cancer. It can also increase your risk for osteoarthritis, sleep apnea, and other respiratory problems. Aim for a slow, steady weight loss. Even a small amount of weight loss can lower your risk of health problems.  How to lose weight safely:  A safe and healthy way to lose weight is to eat fewer calories and get regular exercise. You can lose up about 1 pound a week by decreasing the number of calories you eat by 500 calories each day.   Healthy meal plan for weight management:  A healthy meal plan includes a variety of foods, contains fewer calories, and helps you stay healthy. A healthy meal plan includes the following:  Eat whole-grain foods more often.  A healthy meal plan should contain fiber. Fiber is the part of grains, fruits, and vegetables that is not broken down by your body. Whole-grain foods are healthy and provide extra fiber in your diet. Some examples of whole-grain foods are whole-wheat breads and pastas, oatmeal, brown rice, and bulgur.  Eat a variety of vegetables every day.  Include dark, leafy greens such as spinach, kale, wan greens, and mustard greens. Eat yellow and orange vegetables such as carrots, sweet potatoes, and winter squash.   Eat a variety of fruits every day.  Choose fresh or canned fruit (canned in its own juice or light syrup) instead of juice. Fruit juice has very little or no fiber.  Eat low-fat dairy foods.  Drink fat-free (skim) milk or 1% milk. Eat fat-free yogurt and low-fat cottage cheese. Try low-fat cheeses such as mozzarella and other reduced-fat cheeses.  Choose  meat and other protein foods that are low in fat.  Choose beans or other legumes such as split peas or lentils. Choose fish, skinless poultry (chicken or turkey), or lean cuts of red meat (beef or pork). Before you cook meat or poultry, cut off any visible fat.   Use less fat and oil.  Try baking foods instead of frying them. Add less fat, such as margarine, sour cream, regular salad dressing and mayonnaise to foods. Eat fewer high-fat foods. Some examples of high-fat foods include french fries, doughnuts, ice cream, and cakes.  Eat fewer sweets.  Limit foods and drinks that are high in sugar. This includes candy, cookies, regular soda, and sweetened drinks.  Exercise:  Exercise at least 30 minutes per day on most days of the week. Some examples of exercise include walking, biking, dancing, and swimming. You can also fit in more physical activity by taking the stairs instead of the elevator or parking farther away from stores. Ask your healthcare provider about the best exercise plan for you.      © Copyright [employer] 2018 Information is for End User's use only and may not be sold, redistributed or otherwise used for commercial purposes. All illustrations and images included in CareNotes® are the copyrighted property of A.D.A[employer]M., Inc[employer] or [employer]

## 2024-06-03 NOTE — PROGRESS NOTES
Ambulatory Visit  Name: Aneta Crocker      : 1955      MRN: [id number]  Encounter Provider: Laci Kessler MD  Encounter Date: 6/3/2024   Encounter department: [facility]    Assessment & Plan   1. Medicare annual wellness visit, subsequent  See Medicare wellness note    2. Impaired fasting glucose  -     Hemoglobin A1C; Future  3. Screening for diabetes mellitus  -     Comprehensive metabolic panel; Future  -     Hemoglobin A1C; Future  4. Hypercholesteremia  -     Lipid panel; Future  5. Menopause ovarian failure  -     DXA bone density spine hip and pelvis; Future; Expected date: 2024  6. Allergic rhinitis, unspecified seasonality, unspecified trigger  -     fluticasone (FLONASE) 50 mcg/act nasal spray; 1 spray into each nostril daily     Follow up in 1 year    Preventive health issues were discussed with patient, and age appropriate screening tests were ordered as noted in patient's After Visit Summary. Personalized health advice and appropriate referrals for health education or preventive services given if needed, as noted in patient's After Visit Summary.    History of Present Illness     HPI   Patient Care Team:  Laci Kessler MD as PCP - General (Family Medicine)    Review of Systems  Medical History Reviewed by provider this encounter:  Tobacco  Allergies  Meds  Problems  Med Hx  Surg Hx  Fam Hx       Annual Wellness Visit Questionnaire       Health Risk Assessment:   Patient rates overall health as very good. Patient feels that their physical health rating is same. Patient is very satisfied with their life. Eyesight was rated as same. Hearing was rated as same. Patient feels that their emotional and mental health rating is same. Patients states they are never, rarely angry. Patient states they are never, rarely unusually tired/fatigued. Pain experienced in the last 7 days has been none. Patient states that she has experienced no weight loss or gain in last 6  months.     Depression Screening:   PHQ-2 Score: 0      Fall Risk Screening:   In the past year, patient has experienced: no history of falling in past year      Urinary Incontinence Screening:   Patient has not leaked urine accidently in the last six months.     Home Safety:  Patient does not have trouble with stairs inside or outside of their home. Patient has working smoke alarms and has working carbon monoxide detector. Home safety hazards include: none.     Nutrition:   Current diet is Regular.     Medications:   Patient is currently taking over-the-counter supplements. OTC medications include: see medication list. Patient is able to manage medications.     Activities of Daily Living (ADLs)/Instrumental Activities of Daily Living (IADLs):   Walk and transfer into and out of bed and chair?: Yes  Dress and groom yourself?: Yes    Bathe or shower yourself?: Yes    Feed yourself? Yes  Do your laundry/housekeeping?: Yes  Manage your money, pay your bills and track your expenses?: Yes  Make your own meals?: Yes    Do your own shopping?: Yes    Previous Hospitalizations:   Any hospitalizations or ED visits within the last 12 months?: No      Advance Care Planning:   Living will: Yes    Durable POA for healthcare: Yes    Advanced directive: Yes      PREVENTIVE SCREENINGS      Cardiovascular Screening:    General: History Lipid Disorder    Due for: Lipid Panel      Diabetes Screening:       Due for: Blood Glucose      Colorectal Cancer Screening:     General: Screening Current      Breast Cancer Screening:     General: Screening Current      Cervical Cancer Screening:    General: Screening Not Indicated      Osteoporosis Screening:      Due for: DXA Axial      Abdominal Aortic Aneurysm (AAA) Screening:        General: Screening Not Indicated      Lung Cancer Screening:     General: Screening Not Indicated      Hepatitis C Screening:    General: Screening Current    Screening, Brief Intervention, and Referral to  "Treatment (SBIRT)    Screening  Typical number of drinks in a day: 0  Typical number of drinks in a week: 0  Interpretation: Low risk drinking behavior.    Single Item Drug Screening:  How often have you used an illegal drug (including marijuana) or a prescription medication for non-medical reasons in the past year? never    Single Item Drug Screen Score: 0  Interpretation: Negative screen for possible drug use disorder    Social Determinants of Health     Food Insecurity: No Food Insecurity (6/3/2024)    Hunger Vital Sign    • Worried About Running Out of Food in the Last Year: Never true    • Ran Out of Food in the Last Year: Never true   Transportation Needs: No Transportation Needs (6/3/2024)    PRAPARE - Transportation    • Lack of Transportation (Medical): No    • Lack of Transportation (Non-Medical): No   Housing Stability: Low Risk  (6/3/2024)    Housing Stability Vital Sign    • Unable to Pay for Housing in the Last Year: No    • Number of Times Moved in the Last Year: 1    • Homeless in the Last Year: No   Utilities: Not At Risk (6/3/2024)    [facility] Utilities    • Threatened with loss of utilities: No     No results found.    Objective     /82 (BP Location: Left arm, Patient Position: Sitting, Cuff Size: Large)   Pulse 78   Temp (!) 97.4 °F (36.3 °C)   Ht 5' 6.5\" (1.689 m)   Wt 83 kg (183 lb)   LMP  (LMP Unknown)   SpO2 99%   BMI 29.09 kg/m²     Physical Exam  Vitals and nursing note reviewed.   Constitutional:       General: She is not in acute distress.     Appearance: She is well-developed.   HENT:      Head: Normocephalic and atraumatic.   Eyes:      Conjunctiva/sclera: Conjunctivae normal.   Cardiovascular:      Rate and Rhythm: Normal rate and regular rhythm.      Heart sounds: No murmur heard.  Pulmonary:      Effort: Pulmonary effort is normal. No respiratory distress.      Breath sounds: Normal breath sounds.   Abdominal:      Palpations: Abdomen is soft.      Tenderness: There is no " abdominal tenderness.   Musculoskeletal:         General: No swelling.      Cervical back: Neck supple.   Skin:     General: Skin is warm and dry.      Capillary Refill: Capillary refill takes less than 2 seconds.   Neurological:      Mental Status: She is alert.   Psychiatric:         Mood and Affect: Mood normal.       Administrative Statements   I have spent a total time of 15 minutes on 06/03/24 In caring for this patient including Risks and benefits of tx options.

## 2024-06-04 ENCOUNTER — TELEPHONE (OUTPATIENT)
Dept: FAMILY MEDICINE CLINIC | Facility: CLINIC | Age: 69
End: 2024-06-04

## 2024-06-04 NOTE — TELEPHONE ENCOUNTER
Patient called regarding results.     RN advised on provider note:        Patient verbalized understanding, no further questions/concerns at this time.

## 2024-06-04 NOTE — TELEPHONE ENCOUNTER
LMOMTCB    ----- Message from Laci Kessler MD sent at 6/4/2024 10:43 AM EDT -----  Blood work shows borderline elevated glucose recommend a low carb diet  Also borderline elevated cholesterol recommend to continue statin

## 2024-07-03 PROBLEM — Z00.00 MEDICARE ANNUAL WELLNESS VISIT, SUBSEQUENT: Status: RESOLVED | Noted: 2024-06-03 | Resolved: 2024-07-03

## 2024-08-21 DIAGNOSIS — I10 ESSENTIAL HYPERTENSION: ICD-10-CM

## 2024-08-21 DIAGNOSIS — E78.00 HYPERCHOLESTEREMIA: ICD-10-CM

## 2024-08-21 RX ORDER — LOSARTAN POTASSIUM 50 MG/1
50 TABLET ORAL DAILY
Qty: 100 TABLET | Refills: 1 | Status: SHIPPED | OUTPATIENT
Start: 2024-08-21

## 2024-08-21 RX ORDER — ATORVASTATIN CALCIUM 20 MG/1
20 TABLET, FILM COATED ORAL DAILY
Qty: 100 TABLET | Refills: 1 | Status: SHIPPED | OUTPATIENT
Start: 2024-08-21

## 2024-11-19 DIAGNOSIS — I10 ESSENTIAL HYPERTENSION: ICD-10-CM

## 2024-11-20 RX ORDER — HYDROCHLOROTHIAZIDE 25 MG/1
25 TABLET ORAL DAILY
Qty: 90 TABLET | Refills: 3 | Status: SHIPPED | OUTPATIENT
Start: 2024-11-20

## 2025-01-29 ENCOUNTER — TELEPHONE (OUTPATIENT)
Age: 70
End: 2025-01-29

## 2025-01-29 NOTE — TELEPHONE ENCOUNTER
Patient called, she had scheduled AWV for 6/18/25 @ 9:20 am.      Patient is requesting order for labwork prior to appt.    Please Advise and notify patient. Thank you.

## 2025-01-30 DIAGNOSIS — E78.00 HYPERCHOLESTEREMIA: Primary | ICD-10-CM

## 2025-01-30 DIAGNOSIS — R53.83 OTHER FATIGUE: ICD-10-CM

## 2025-01-30 DIAGNOSIS — R73.01 IMPAIRED FASTING GLUCOSE: ICD-10-CM

## 2025-02-17 DIAGNOSIS — I10 ESSENTIAL HYPERTENSION: ICD-10-CM

## 2025-02-17 DIAGNOSIS — E78.00 HYPERCHOLESTEREMIA: ICD-10-CM

## 2025-02-17 RX ORDER — LOSARTAN POTASSIUM 50 MG/1
50 TABLET ORAL DAILY
Qty: 90 TABLET | Refills: 0 | Status: SHIPPED | OUTPATIENT
Start: 2025-02-17

## 2025-02-17 RX ORDER — ATORVASTATIN CALCIUM 20 MG/1
20 TABLET, FILM COATED ORAL DAILY
Qty: 90 TABLET | Refills: 0 | Status: SHIPPED | OUTPATIENT
Start: 2025-02-17

## 2025-05-19 DIAGNOSIS — E78.00 HYPERCHOLESTEREMIA: ICD-10-CM

## 2025-05-19 DIAGNOSIS — I10 ESSENTIAL HYPERTENSION: ICD-10-CM

## 2025-05-19 RX ORDER — LOSARTAN POTASSIUM 50 MG/1
50 TABLET ORAL DAILY
Qty: 90 TABLET | Refills: 1 | Status: SHIPPED | OUTPATIENT
Start: 2025-05-19

## 2025-05-19 RX ORDER — ATORVASTATIN CALCIUM 20 MG/1
20 TABLET, FILM COATED ORAL DAILY
Qty: 90 TABLET | Refills: 1 | Status: SHIPPED | OUTPATIENT
Start: 2025-05-19

## 2025-07-07 ENCOUNTER — RA CDI HCC (OUTPATIENT)
Dept: OTHER | Facility: HOSPITAL | Age: 70
End: 2025-07-07

## 2025-07-10 DIAGNOSIS — J30.9 ALLERGIC RHINITIS, UNSPECIFIED SEASONALITY, UNSPECIFIED TRIGGER: ICD-10-CM

## 2025-07-11 RX ORDER — FLUTICASONE PROPIONATE 50 MCG
1 SPRAY, SUSPENSION (ML) NASAL DAILY
Qty: 16 G | Refills: 5 | Status: SHIPPED | OUTPATIENT
Start: 2025-07-11

## 2025-07-17 NOTE — PROGRESS NOTES
"Name: Aneta Crocker      : 1955      MRN: [id number]  Encounter Provider: Kieran Albarado PA-C  Encounter Date: 2025   Encounter department: [facility]    Assessment & Plan  Skin lesion  Pt c/o of a skin lesion located posterior Rt calf    First noticed 1 day(s) ago, and has not worsened since onset, unsure of how long it may have been present  has not spread  has not attempted to alleviate their sx at this time   Described as nonpruritic and nonpainful  Pertinent negatives include no recent known insect bite, tick bite, prior trauma.   Pt. does not endorse new lotions/body washes or new foods  Examination revealing 0.75 inch well-circumscribed annular skin lesion in the posterior aspect of the right calf towards the Achilles area, uncomplicated and blanchable  Discussed with patient high suspicion for uncomplicated insect bite or mild trauma, though given annular appearance cannot fully rule out tinea infection  Discussed risk and benefits of topical cream, using shared decision making we will initiate Lotrisone cream for coverage for fungal etiology as well as symptomatic treatment if pruritus occurs  Follow-up as needed  Orders:    clotrimazole-betamethasone (LOTRISONE) 1-0.05 % cream; Apply topically 2 (two) times a day         History of Present Illness     Aneta Crocker is a 70 y.o. female  presenting for concerns of a red victoriano on her leg.        **Note: Portions of the record may have been created with voice recognition software.  Occasional wrong word or \"sound alike\" substitutions may have occurred due to the inherent limitations of voice recognition software.  Please read the chart carefully and recognize, using context, where substitutions have occurred. Please contact for further clarification, when necessary.     Review of Systems   Constitutional:  Negative for chills and fever.   HENT:  Negative for ear pain and sore throat.    Eyes:  Negative for pain and " visual disturbance.   Respiratory:  Negative for cough and shortness of breath.    Cardiovascular:  Negative for chest pain and palpitations.   Gastrointestinal:  Negative for abdominal pain and vomiting.   Genitourinary:  Negative for dysuria and hematuria.   Musculoskeletal:  Negative for arthralgias and back pain.   Skin:  Positive for rash. Negative for color change.   Neurological:  Negative for seizures and syncope.   All other systems reviewed and are negative.    Past Medical History[1]  Past Surgical History[2]  Family History[3]  Social History[4]  Medications[5]  Allergies   Allergen Reactions    Rosuvastatin     Spironolactone      Immunization History   Administered Date(s) Administered    INFLUENZA 11/15/2021, 12/17/2021, 12/05/2022    Influenza, injectable, quadrivalent, preservative free 0.5 mL 02/06/2019    Pneumococcal Conjugate Vaccine 20-valent (Pcv20), Polysace 08/01/2022    Tdap 10/17/2022    Zoster Vaccine Recombinant 08/17/2022, 10/17/2022     Objective   LMP  (LMP Unknown)     Physical Exam  Vitals and nursing note reviewed.   Constitutional:       General: She is not in acute distress.     Appearance: She is well-developed.   HENT:      Head: Normocephalic and atraumatic.     Eyes:      Conjunctiva/sclera: Conjunctivae normal.       Cardiovascular:      Rate and Rhythm: Normal rate and regular rhythm.      Heart sounds: No murmur heard.  Pulmonary:      Effort: Pulmonary effort is normal. No respiratory distress.      Breath sounds: Normal breath sounds. No wheezing.   Abdominal:      Palpations: Abdomen is soft.      Tenderness: There is no abdominal tenderness.     Musculoskeletal:         General: No swelling.      Cervical back: Neck supple.     Skin:     General: Skin is warm and dry.      Findings: Rash (0.75 inch well-circumscribed annular skin lesion in the posterior aspect of the right calf towards the Achilles area, uncomplicated and blanchable) present.     Neurological:       Mental Status: She is alert and oriented to person, place, and time.     Psychiatric:         Mood and Affect: Mood normal.                [1]   Past Medical History:  Diagnosis Date    Fibroids     High cholesterol     Hypertension    [2]   Past Surgical History:  Procedure Laterality Date    COLONOSCOPY  2018    VEIN SURGERY     [3]   Family History  Problem Relation Name Age of Onset    Heart disease Mother      Prostate cancer Father      Breast cancer Neg Hx      Colon cancer Neg Hx      Ovarian cancer Neg Hx     [4]   Social History  Tobacco Use    Smoking status: Never    Smokeless tobacco: Never   Substance and Sexual Activity    Alcohol use: Yes     Comment: socially     Drug use: No    Sexual activity: Never     Partners: Male     Birth control/protection: None   [5]   Current Outpatient Medications on File Prior to Visit   Medication Sig    atorvastatin (LIPITOR) 20 mg tablet TAKE 1 TABLET DAILY    calcium carbonate (OS-YESSI) 600 MG tablet Take 600 mg by mouth 2 (two) times a day with meals    Cholecalciferol (VITAMIN D3) 1,000 units tablet Take 1,000 Units by mouth daily    fluticasone (FLONASE) 50 mcg/act nasal spray USE 1 SPRAY IN EACH NOSTRIL DAILY    hydroCHLOROthiazide 25 mg tablet TAKE 1 TABLET DAILY    losartan (COZAAR) 50 mg tablet TAKE 1 TABLET DAILY    Multiple Vitamins-Minerals (MULTIVITAMIN ADULT PO) Take by mouth    Omega-3 Fatty Acids (FISH OIL) 1200 MG CAPS Take by mouth      nasal spray USE 1 SPRAY IN EACH NOSTRIL DAILY    hydroCHLOROthiazide 25 mg tablet TAKE 1 TABLET DAILY    losartan (COZAAR) 50 mg tablet TAKE 1 TABLET DAILY    Multiple Vitamins-Minerals (MULTIVITAMIN ADULT PO) Take by mouth    Omega-3 Fatty Acids (FISH OIL) 1200 MG CAPS Take by mouth

## 2025-07-18 ENCOUNTER — OFFICE VISIT (OUTPATIENT)
Dept: FAMILY MEDICINE CLINIC | Facility: CLINIC | Age: 70
End: 2025-07-18
Payer: MEDICARE

## 2025-07-18 VITALS
SYSTOLIC BLOOD PRESSURE: 142 MMHG | DIASTOLIC BLOOD PRESSURE: 78 MMHG | WEIGHT: 176 LBS | OXYGEN SATURATION: 98 % | BODY MASS INDEX: 27.62 KG/M2 | HEART RATE: 77 BPM | HEIGHT: 67 IN | TEMPERATURE: 97.6 F

## 2025-07-18 DIAGNOSIS — L98.9 SKIN LESION: Primary | ICD-10-CM

## 2025-07-18 DIAGNOSIS — H61.23 BILATERAL IMPACTED CERUMEN: ICD-10-CM

## 2025-07-18 PROCEDURE — 69209 REMOVE IMPACTED EAR WAX UNI: CPT

## 2025-07-18 PROCEDURE — 99213 OFFICE O/P EST LOW 20 MIN: CPT

## 2025-07-18 PROCEDURE — G2211 COMPLEX E/M VISIT ADD ON: HCPCS

## 2025-07-18 RX ORDER — CLOTRIMAZOLE AND BETAMETHASONE DIPROPIONATE 10; .64 MG/G; MG/G
CREAM TOPICAL 2 TIMES DAILY
Qty: 15 G | Refills: 0 | Status: SHIPPED | OUTPATIENT
Start: 2025-07-18

## 2025-08-13 ENCOUNTER — OFFICE VISIT (OUTPATIENT)
Dept: FAMILY MEDICINE CLINIC | Facility: CLINIC | Age: 70
End: 2025-08-13
Payer: MEDICARE